# Patient Record
Sex: MALE | Race: WHITE | NOT HISPANIC OR LATINO | Employment: FULL TIME | URBAN - METROPOLITAN AREA
[De-identification: names, ages, dates, MRNs, and addresses within clinical notes are randomized per-mention and may not be internally consistent; named-entity substitution may affect disease eponyms.]

---

## 2017-03-13 ENCOUNTER — HOSPITAL ENCOUNTER (EMERGENCY)
Facility: MEDICAL CENTER | Age: 22
End: 2017-03-13
Attending: EMERGENCY MEDICINE
Payer: COMMERCIAL

## 2017-03-13 ENCOUNTER — APPOINTMENT (OUTPATIENT)
Dept: RADIOLOGY | Facility: MEDICAL CENTER | Age: 22
End: 2017-03-13
Attending: EMERGENCY MEDICINE
Payer: COMMERCIAL

## 2017-03-13 VITALS
BODY MASS INDEX: 19.41 KG/M2 | WEIGHT: 128.09 LBS | HEART RATE: 52 BPM | DIASTOLIC BLOOD PRESSURE: 54 MMHG | HEIGHT: 68 IN | OXYGEN SATURATION: 97 % | RESPIRATION RATE: 16 BRPM | SYSTOLIC BLOOD PRESSURE: 88 MMHG | TEMPERATURE: 97.3 F

## 2017-03-13 DIAGNOSIS — R07.9 ACUTE CHEST PAIN: ICD-10-CM

## 2017-03-13 DIAGNOSIS — Z87.74 HISTORY OF CONGENITAL HEART DISEASE: ICD-10-CM

## 2017-03-13 DIAGNOSIS — T50.905A MEDICATION SIDE EFFECT, INITIAL ENCOUNTER: ICD-10-CM

## 2017-03-13 DIAGNOSIS — I95.9 HYPOTENSION, UNSPECIFIED HYPOTENSION TYPE: ICD-10-CM

## 2017-03-13 LAB
ALBUMIN SERPL BCP-MCNC: 4.7 G/DL (ref 3.2–4.9)
ALBUMIN/GLOB SERPL: 1.8 G/DL
ALP SERPL-CCNC: 80 U/L (ref 30–99)
ALT SERPL-CCNC: 18 U/L (ref 2–50)
ANION GAP SERPL CALC-SCNC: 9 MMOL/L (ref 0–11.9)
APTT PPP: 29.3 SEC (ref 24.7–36)
AST SERPL-CCNC: 26 U/L (ref 12–45)
BASOPHILS # BLD AUTO: 0.4 % (ref 0–1.8)
BASOPHILS # BLD: 0.03 K/UL (ref 0–0.12)
BILIRUB SERPL-MCNC: 2.5 MG/DL (ref 0.1–1.5)
BNP SERPL-MCNC: 17 PG/ML (ref 0–100)
BUN SERPL-MCNC: 15 MG/DL (ref 8–22)
CALCIUM SERPL-MCNC: 9.5 MG/DL (ref 8.5–10.5)
CHLORIDE SERPL-SCNC: 105 MMOL/L (ref 96–112)
CO2 SERPL-SCNC: 25 MMOL/L (ref 20–33)
CREAT SERPL-MCNC: 0.77 MG/DL (ref 0.5–1.4)
EKG IMPRESSION: NORMAL
EOSINOPHIL # BLD AUTO: 0.04 K/UL (ref 0–0.51)
EOSINOPHIL NFR BLD: 0.5 % (ref 0–6.9)
ERYTHROCYTE [DISTWIDTH] IN BLOOD BY AUTOMATED COUNT: 39.4 FL (ref 35.9–50)
GFR SERPL CREATININE-BSD FRML MDRD: >60 ML/MIN/1.73 M 2
GLOBULIN SER CALC-MCNC: 2.6 G/DL (ref 1.9–3.5)
GLUCOSE SERPL-MCNC: 94 MG/DL (ref 65–99)
HCT VFR BLD AUTO: 45.1 % (ref 42–52)
HGB BLD-MCNC: 14.2 G/DL (ref 14–18)
IMM GRANULOCYTES # BLD AUTO: 0.03 K/UL (ref 0–0.11)
IMM GRANULOCYTES NFR BLD AUTO: 0.4 % (ref 0–0.9)
INR PPP: 0.97 (ref 0.87–1.13)
LIPASE SERPL-CCNC: 13 U/L (ref 11–82)
LYMPHOCYTES # BLD AUTO: 1.34 K/UL (ref 1–4.8)
LYMPHOCYTES NFR BLD: 16.3 % (ref 22–41)
MCH RBC QN AUTO: 26 PG (ref 27–33)
MCHC RBC AUTO-ENTMCNC: 31.5 G/DL (ref 33.7–35.3)
MCV RBC AUTO: 82.6 FL (ref 81.4–97.8)
MONOCYTES # BLD AUTO: 0.45 K/UL (ref 0–0.85)
MONOCYTES NFR BLD AUTO: 5.5 % (ref 0–13.4)
NEUTROPHILS # BLD AUTO: 6.34 K/UL (ref 1.82–7.42)
NEUTROPHILS NFR BLD: 76.9 % (ref 44–72)
NRBC # BLD AUTO: 0 K/UL
NRBC BLD AUTO-RTO: 0 /100 WBC
PLATELET # BLD AUTO: 228 K/UL (ref 164–446)
PMV BLD AUTO: 10.4 FL (ref 9–12.9)
POTASSIUM SERPL-SCNC: 4.5 MMOL/L (ref 3.6–5.5)
PROT SERPL-MCNC: 7.3 G/DL (ref 6–8.2)
PROTHROMBIN TIME: 13.2 SEC (ref 12–14.6)
RBC # BLD AUTO: 5.46 M/UL (ref 4.7–6.1)
SODIUM SERPL-SCNC: 139 MMOL/L (ref 135–145)
TROPONIN I SERPL-MCNC: <0.01 NG/ML (ref 0–0.04)
WBC # BLD AUTO: 8.2 K/UL (ref 4.8–10.8)

## 2017-03-13 PROCEDURE — 700102 HCHG RX REV CODE 250 W/ 637 OVERRIDE(OP): Performed by: EMERGENCY MEDICINE

## 2017-03-13 PROCEDURE — 99284 EMERGENCY DEPT VISIT MOD MDM: CPT

## 2017-03-13 PROCEDURE — 85610 PROTHROMBIN TIME: CPT

## 2017-03-13 PROCEDURE — 83690 ASSAY OF LIPASE: CPT

## 2017-03-13 PROCEDURE — 93005 ELECTROCARDIOGRAM TRACING: CPT

## 2017-03-13 PROCEDURE — 84484 ASSAY OF TROPONIN QUANT: CPT

## 2017-03-13 PROCEDURE — 83880 ASSAY OF NATRIURETIC PEPTIDE: CPT

## 2017-03-13 PROCEDURE — 80053 COMPREHEN METABOLIC PANEL: CPT

## 2017-03-13 PROCEDURE — 85025 COMPLETE CBC W/AUTO DIFF WBC: CPT

## 2017-03-13 PROCEDURE — 71010 DX-CHEST-LIMITED (1 VIEW): CPT

## 2017-03-13 PROCEDURE — A9270 NON-COVERED ITEM OR SERVICE: HCPCS | Performed by: EMERGENCY MEDICINE

## 2017-03-13 PROCEDURE — 85730 THROMBOPLASTIN TIME PARTIAL: CPT

## 2017-03-13 RX ADMIN — LIDOCAINE HYDROCHLORIDE 30 ML: 20 SOLUTION OROPHARYNGEAL at 07:01

## 2017-03-13 ASSESSMENT — LIFESTYLE VARIABLES: DO YOU DRINK ALCOHOL: NO

## 2017-03-13 ASSESSMENT — PAIN SCALES - GENERAL: PAINLEVEL_OUTOF10: 0

## 2017-03-13 NOTE — ED PROVIDER NOTES
ED Provider Note    Scribed for Cody Peterson M.D. by Bebeto Madrigal. 3/13/2017  5:22 AM    Primary care provider: Sariah Brand M.D.  Means of arrival: walk in  History obtained from: patient  History limited by: none    CHIEF COMPLAINT  Chief Complaint   Patient presents with   • Chest Pain       HPI  Jamal Childress is a 21 y.o. male who presents to the Emergency Department with central, deep chest pain starting 5 hours ago. He describes his chest pain as crushing that is 10/10 severity. He denies that the pain radiates. Patient has associated nausea. Patient has a history of chest pain starting 2 years ago secondary to arterial enlargement, but states that this pain is significantly worse today. Patient is followed by Dr. Armando cardiologist for enlarging artery. He had an echocardiogram 2 months ago that he repots was improved from previous. Father states that the patient was placed on Losartan with improvement to his undelying chronic condition. He denies vomiting, shortness of breath.    REVIEW OF SYSTEMS  Pertinent positives include chest pain, nausea.   Pertinent negatives include no vomiting, shortness of breath.    All other systems reviewed and negative.    C.    PAST MEDICAL HISTORY   has a past medical history of Transposition of great vessels.    SURGICAL HISTORY   has past surgical history that includes other cardiac surgery.    SOCIAL HISTORY  Social History   Substance Use Topics   • Smoking status: Never Smoker    • Smokeless tobacco: None noted   • Alcohol Use: Yes      Comment: occ      History   Drug Use No       FAMILY HISTORY  None noted for this encounter.    CURRENT MEDICATIONS  Home Medications     Reviewed by Enedina Olmstead R.N. (Registered Nurse) on 03/13/17 at 0232  Med List Status: Complete    Medication Last Dose Status    losartan (COZAAR) 50 MG TABS 3/13/2017 Active                ALLERGIES  No Known Allergies    PHYSICAL EXAM  VITAL SIGNS: BP 88/54 mmHg  Pulse 56   "Temp(Src) 36.3 °C (97.3 °F)  Resp 17  Ht 1.727 m (5' 8\")  Wt 58.1 kg (128 lb 1.4 oz)  BMI 19.48 kg/m2  SpO2 99%    Nursing note and vitals reviewed.  Constitutional: Well-developed and well-nourished. No distress.   HENT: Head is normocephalic and atraumatic. Oropharynx is clear and moist without exudate or erythema.   Eyes: Pupils are equal, round, and reactive to light. Conjunctiva are normal.   Cardiovascular: Bradycardic. Normal radial pulses. Systolic ejection murmur best heard at the left upper sternal border.  Pulmonary/Chest: Breath sounds normal. No wheezes or rales. Well healed sternotomy scar.   Abdominal: Soft and non-tender. No distention    Musculoskeletal: Extremities exhibit normal range of motion without edema or tenderness.   Neurological: Awake, alert and oriented to person, place, and time. No focal deficits noted.  Skin: Skin is warm and dry. No rash.   Psychiatric: Normal mood and affect. Appropriate for clinical situation    DIAGNOSTIC STUDIES / PROCEDURES    EKG Interpretation  See Labs below.     LABS  Results for orders placed or performed during the hospital encounter of 03/13/17   TROPONIN   Result Value Ref Range    Troponin I <0.01 0.00 - 0.04 ng/mL   BTYPE NATRIURETIC PEPTIDE   Result Value Ref Range    B Natriuretic Peptide 17 0 - 100 pg/mL   CBC WITH DIFFERENTIAL   Result Value Ref Range    WBC 8.2 4.8 - 10.8 K/uL    RBC 5.46 4.70 - 6.10 M/uL    Hemoglobin 14.2 14.0 - 18.0 g/dL    Hematocrit 45.1 42.0 - 52.0 %    MCV 82.6 81.4 - 97.8 fL    MCH 26.0 (L) 27.0 - 33.0 pg    MCHC 31.5 (L) 33.7 - 35.3 g/dL    RDW 39.4 35.9 - 50.0 fL    Platelet Count 228 164 - 446 K/uL    MPV 10.4 9.0 - 12.9 fL    Neutrophils-Polys 76.90 (H) 44.00 - 72.00 %    Lymphocytes 16.30 (L) 22.00 - 41.00 %    Monocytes 5.50 0.00 - 13.40 %    Eosinophils 0.50 0.00 - 6.90 %    Basophils 0.40 0.00 - 1.80 %    Immature Granulocytes 0.40 0.00 - 0.90 %    Nucleated RBC 0.00 /100 WBC    Neutrophils (Absolute) 6.34 " 1.82 - 7.42 K/uL    Lymphs (Absolute) 1.34 1.00 - 4.80 K/uL    Monos (Absolute) 0.45 0.00 - 0.85 K/uL    Eos (Absolute) 0.04 0.00 - 0.51 K/uL    Baso (Absolute) 0.03 0.00 - 0.12 K/uL    Immature Granulocytes (abs) 0.03 0.00 - 0.11 K/uL    NRBC (Absolute) 0.00 K/uL   COMP METABOLIC PANEL   Result Value Ref Range    Sodium 139 135 - 145 mmol/L    Potassium 4.5 3.6 - 5.5 mmol/L    Chloride 105 96 - 112 mmol/L    Co2 25 20 - 33 mmol/L    Anion Gap 9.0 0.0 - 11.9    Glucose 94 65 - 99 mg/dL    Bun 15 8 - 22 mg/dL    Creatinine 0.77 0.50 - 1.40 mg/dL    Calcium 9.5 8.5 - 10.5 mg/dL    AST(SGOT) 26 12 - 45 U/L    ALT(SGPT) 18 2 - 50 U/L    Alkaline Phosphatase 80 30 - 99 U/L    Total Bilirubin 2.5 (H) 0.1 - 1.5 mg/dL    Albumin 4.7 3.2 - 4.9 g/dL    Total Protein 7.3 6.0 - 8.2 g/dL    Globulin 2.6 1.9 - 3.5 g/dL    A-G Ratio 1.8 g/dL   PROTHROMBIN TIME   Result Value Ref Range    PT 13.2 12.0 - 14.6 sec    INR 0.97 0.87 - 1.13   APTT   Result Value Ref Range    APTT 29.3 24.7 - 36.0 sec   LIPASE   Result Value Ref Range    Lipase 13 11 - 82 U/L   ESTIMATED GFR   Result Value Ref Range    GFR If African American >60 >60 mL/min/1.73 m 2    GFR If Non African American >60 >60 mL/min/1.73 m 2   EKG (ER)   Result Value Ref Range    Report       [facility] Emergency Dept.    Test Date:  2017  Pt Name:    GARETH MCCARTHY                 Department: ER  MRN:        [id number]                      Room:  Gender:     SADIA                            Technician: [id number]  :        1995                   Requested By:ER TRIAGE PROTOCOL  Order #:    [id number]                    Reading MD:    Measurements  Intervals                                Axis  Rate:       67                           P:          23  VT:         140                          QRS:        78  QRSD:       106                          T:          74  QT:         432  QTc:        456    Interpretive Statements  SINUS RHYTHM  PROBABLE LEFT  VENTRICULAR HYPERTROPHY  LATERAL Q WAVES, PROBABLY NORMAL VARIATION  No previous ECG available for comparison     All labs reviewed by me.    RADIOLOGY  DX-CHEST-LIMITED (1 VIEW)   Final Result         1. Cardiomegaly. No pulmonary infiltrates or consolidations are noted.      The radiologist's interpretation of all radiological studies have been reviewed by me.    COURSE & MEDICAL DECISION MAKING  Nursing notes, VS, PMSFHx reviewed in chart.     Review of past medical records shows the patient is being followed by Dr. Armando.      5:22 AM - Patient seen and examined at bedside. Ordered DX chest, Estimated GFR, Troponin, BNP, CBC with differential, CMP, Prothrombin time, APTT, Lipase to evaluate his symptoms. The differential diagnoses include but are not limited to: chest wall pain, arrythmia, pneumonia, GERD, pneumothorax.    6:51 AM - I discussed the patient's case and the above findings with Dr. Armando (cardiologist) who recommends reducing his losartan to 1/2 tablet each night. She does not think that CT scan or echocardiogram is indicated at this time. I agree and will inform the patient.     6:56 AM Recheck: Patient re-evaluated at beside. Discussed patient's condition and treatment plan. Patient's lab and radiology results discussed. Patient will be treated with GI cocktail 30mg PO. He will be discharged and given discharge instructions. Advised to follow up with Dr. Armando. Instructed to return to Emergency Department immediately if any worsening symptoms. Patient verbalizes understanding and agreement to this plan of care.     The patient will return for new or worsening symptoms and is stable at the time of discharge.    DISPOSITION:  Patient will be discharged home in stable condition.    FOLLOW UP:  [facility], Emergency Dept  [address] [address] [phone]    If symptoms worsen    Charbel Armando M.D.  [address]  [address]  [facility] [id number] [phone]    Call  today        OUTPATIENT MEDICATIONS:  New Prescriptions    No medications on file           FINAL IMPRESSION  1. Acute chest pain    2. History of congenital heart disease    3. Medication side effect, initial encounter    4. Hypotension, unspecified hypotension type          I, Bebeto Madrigal (Scribe), am scribing for, and in the presence of, Cody Peterson M.D..    Electronically signed by: Bebeto Madrigal (Scribe), 3/13/2017    ICody M.D. personally performed the services described in this documentation, as scribed by Bebeto Madrigal in my presence, and it is both accurate and complete.    The note accurately reflects work and decisions made by me.  Cody Peterson  3/13/2017  11:20 AM

## 2017-03-13 NOTE — DISCHARGE INSTRUCTIONS
Chest Pain, Nonspecific  It is often hard to give a specific diagnosis for the cause of chest pain. There is always a chance that your pain could be related to something serious, like a heart attack or a blood clot in the lungs. You need to follow up with your caregiver for further evaluation. More lab tests or other studies such as X-rays, electrocardiography, stress testing, or cardiac imaging may be needed to find the cause of your pain.  Most of the time, nonspecific chest pain improves within 2 to 3 days with rest and mild pain medicine. For the next few days, avoid physical exertion or activities that bring on pain. Do not smoke. Avoid drinking alcohol. Call your caregiver for routine follow-up as advised.   SEEK IMMEDIATE MEDICAL CARE IF:  · You develop increased chest pain or pain that radiates to the arm, neck, jaw, back, or abdomen.   · You develop shortness of breath, increased coughing, or you start coughing up blood.   · You have severe back or abdominal pain, nausea, or vomiting.   · You develop severe weakness, fainting, fever, or chills.   Document Released: 12/18/2006 Document Revised: 03/11/2013 Document Reviewed: 06/06/2008  [email or web address]® Patient Information ©2013 [employer].

## 2017-03-13 NOTE — ED AVS SNAPSHOT
Home Care Instructions                                                                                                                Jamal Childress   MRN: [id number]    Department:  [facility], Emergency Dept   Date of Visit:  3/13/2017            [facility], Emergency Dept    [address] [address]    Phone:  [phone]      You were seen by     Cody Peterson M.D.      Your Diagnosis Was     Acute chest pain     R07.9       These are the medications you received during your hospitalization from 03/13/2017 0211 to 03/13/2017 0704     Date/Time Order Dose Route Action    03/13/2017 0701 hyoscyamine-maalox plus-lidocaine viscous (GI COCKTAIL) oral susp 30 mL 30 mL Oral Given      Follow-up Information     1. Follow up with [facility], Emergency Dept.    Specialty:  Emergency Medicine    Why:  If symptoms worsen    Contact information    [address] [place] [address] [phone]        2. Follow up with Charbel Armando M.D.. Call today.    Specialty:  Pediatric Cardiology    Contact information    [address]  [address]  [facility] [address] [phone]        Medication Information     Review all of your home medications and newly ordered medications with your primary doctor and/or pharmacist as soon as possible. Follow medication instructions as directed by your doctor and/or pharmacist.     Please keep your complete medication list with you and share with your physician. Update the information when medications are discontinued, doses are changed, or new medications (including over-the-counter products) are added; and carry medication information at all times in the event of emergency situations.               Medication List      ASK your doctor about these medications        Instructions    Morning Afternoon Evening Bedtime    losartan 50 MG Tabs   Commonly known as:  COZAAR        Take 50 mg by mouth every day.   Dose:   50 mg                                Procedures and tests performed during your visit     APTT    BTYPE NATRIURETIC PEPTIDE    CARDIAC MONITORING    CBC WITH DIFFERENTIAL    COMP METABOLIC PANEL    DX-CHEST-LIMITED (1 VIEW)    EKG (ER)    ESTIMATED GFR    LIPASE    O2 Protocol    PROTHROMBIN TIME    TROPONIN        Discharge Instructions       Chest Pain, Nonspecific  It is often hard to give a specific diagnosis for the cause of chest pain. There is always a chance that your pain could be related to something serious, like a heart attack or a blood clot in the lungs. You need to follow up with your caregiver for further evaluation. More lab tests or other studies such as X-rays, electrocardiography, stress testing, or cardiac imaging may be needed to find the cause of your pain.  Most of the time, nonspecific chest pain improves within 2 to 3 days with rest and mild pain medicine. For the next few days, avoid physical exertion or activities that bring on pain. Do not smoke. Avoid drinking alcohol. Call your caregiver for routine follow-up as advised.   SEEK IMMEDIATE MEDICAL CARE IF:  · You develop increased chest pain or pain that radiates to the arm, neck, jaw, back, or abdomen.   · You develop shortness of breath, increased coughing, or you start coughing up blood.   · You have severe back or abdominal pain, nausea, or vomiting.   · You develop severe weakness, fainting, fever, or chills.   Document Released: 12/18/2006 Document Revised: 03/11/2013 Document Reviewed: 06/06/2008  [employer]® Patient Information ©2013 [employer].          Patient Information     Patient Information    Following emergency treatment: all patient requiring follow-up care must return either to a private physician or a clinic if your condition worsens before you are able to obtain further medical attention, please return to the emergency room.     Billing Information    At [facility], we work to make the billing process streamlined  for our patients.  Our Representatives are here to answer any questions you may have regarding your hospital bill.  If you have insurance coverage and have supplied your insurance information to us, we will submit a claim to your insurer on your behalf.  Should you have any questions regarding your bill, we can be reached online or by phone as follows:  Online: You are able pay your bills online or live chat with our representatives about any billing questions you may have. We are here to help Monday - Friday from 8:00am to 7:30pm and 9:00am - 12:00pm on Saturdays.  Please visit [email or web address]  for more information.   Phone:  [phone] or [phone]    Please note that your emergency physician, surgeon, pathologist, radiologist, anesthesiologist, and other specialists are not employed by [facility] and will therefore bill separately for their services.  Please contact them directly for any questions concerning their bills at the numbers below:     Emergency Physician Services:  [phone]  [facility]:  [phone]  [facility]:  [phone]  [facility]:  [phone]    1. Your final bill may vary from the amount quoted upon discharge if all procedures are not complete at that time, or if your doctor has additional procedures of which we are not aware. You will receive an additional bill if you return to the Emergency Department at [facility] for suture removal regardless of the facility of which the sutures were placed.     2. Please arrange for settlement of this account at the emergency registration.    3. All self-pay accounts are due in full at the time of treatment.  If you are unable to meet this obligation then payment is expected within 4-5 days.     4. If you have had radiology studies (CT, X-ray, Ultrasound, MRI), you have received a preliminary result during your emergency department visit. Please contact  the radiology department [phone] to receive a copy of your final result. Please discuss the Final result with your primary physician or with the follow up physician provided.     Crisis Hotline:  [place] Crisis Hotline:  7-978-TVKYUEO or 1-855.620.3994  [place] Crisis Hotline:    1-575.571.2200 or 679-217-9491         ED Discharge Follow Up Questions    1. In order to provide you with very good care, we would like to follow up with a phone call in the next few days.  May we have your permission to contact you?     YES /  NO    2. What is the best phone number to call you? (       )_____-__________    3. What is the best time to call you?      Morning  /  Afternoon  /  Evening                   Patient Signature:  ____________________________________________________________    Date:  ____________________________________________________________

## 2017-03-13 NOTE — ED NOTES
"Patient experienced a syncopal or near syncopal event after blood draw. He was walking to bathroom and \"felt everything go black\". It was witnessed that he lowered himself to ground. He is pale appearing in triage. BP 88/54, HR 54.   "

## 2017-03-13 NOTE — ED AVS SNAPSHOT
[employer] Access Code: [email or web address]  Expires: 4/12/2017  7:04 AM    [employer]  A secure, online tool to manage your health information     [employer]’s [employer]® is a secure, online tool that connects you to your personalized health information from the privacy of your home -- day or night - making it very easy for you to manage your healthcare. Once the activation process is completed, you can even access your medical information using the [employer] matthew, which is available for free in the Apple Matthew store or Google Play store.     [employer] provides the following levels of access (as shown below):   My Chart Features   [facility] Primary Care Doctor [facility]  Specialists [facility]  Urgent  Care Non-[facility]  Primary Care  Doctor   Email your healthcare team securely and privately 24/7 X X X X   Manage appointments: schedule your next appointment; view details of past/upcoming appointments X      Request prescription refills. X      View recent personal medical records, including lab and immunizations X X X X   View health record, including health history, allergies, medications X X X X   Read reports about your outpatient visits, procedures, consult and ER notes X X X X   See your discharge summary, which is a recap of your hospital and/or ER visit that includes your diagnosis, lab results, and care plan. X X       How to register for [employer]:  1. Go to  [email or web address].  2. Click on the Sign Up Now box, which takes you to the New Member Sign Up page. You will need to provide the following information:  a. Enter your [employer] Access Code exactly as it appears at the top of this page. (You will not need to use this code after you’ve completed the sign-up process. If you do not sign up before the expiration date, you must request a new code.)   b. Enter your date of birth.   c. Enter your home email address.   d. Click Submit, and follow the next screen’s instructions.  3. Create a [employer] ID. This will be your [employer]  login ID and cannot be changed, so think of one that is secure and easy to remember.  4. Create a [employer] password. You can change your password at any time.  5. Enter your Password Reset Question and Answer. This can be used at a later time if you forget your password.   6. Enter your e-mail address. This allows you to receive e-mail notifications when new information is available in [employer].  7. Click Sign Up. You can now view your health information.    For assistance activating your [employer] account, call [phone]

## 2017-03-13 NOTE — ED NOTES
Patient to ED triage with complaints of crushing chest pain. Had to events tonight. Recently at 0100. He was in shoulder, did not radiate, nothing made it better or worse, was 10/10. Hx of great vessel transposition. He not experienced this chest pain before. CP is currently resolved. Had EKG in triage.   Pt educated on ED process and asked to wait in lobby until appropriate bed assignment can be made. Patient educated on importance of alerting staff to new or worsening symptoms or concerns.

## 2017-03-13 NOTE — ED AVS SNAPSHOT
3/13/2017          Jamal Childress  [address] [address]    Dear Jamal:    [facility] wants to ensure your discharge home is safe and you or your loved ones have had all your questions answered regarding your care after you leave the hospital.    You may receive a telephone call within two days of your discharge.  This call is to make certain you understand your discharge instructions as well as ensure we provided you with the best care possible during your stay with us.     The call will only last approximately 3-5 minutes and will be done by a nurse.    Once again, we want to ensure your discharge home is safe and that you have a clear understanding of any next steps in your care.  If you have any questions or concerns, please do not hesitate to contact us, we are here for you.  Thank you for choosing [facility] for your healthcare needs.    Sincerely,    Filemon Campbell    [facility]

## 2017-04-12 ENCOUNTER — HOSPITAL ENCOUNTER (OUTPATIENT)
Dept: RADIOLOGY | Facility: MEDICAL CENTER | Age: 22
End: 2017-04-12
Attending: EMERGENCY MEDICINE
Payer: COMMERCIAL

## 2017-04-12 ENCOUNTER — HOSPITAL ENCOUNTER (OUTPATIENT)
Dept: LAB | Facility: MEDICAL CENTER | Age: 22
End: 2017-04-12
Attending: EMERGENCY MEDICINE
Payer: COMMERCIAL

## 2017-04-12 ENCOUNTER — OFFICE VISIT (OUTPATIENT)
Dept: URGENT CARE | Facility: PHYSICIAN GROUP | Age: 22
End: 2017-04-12
Payer: COMMERCIAL

## 2017-04-12 VITALS
TEMPERATURE: 98.3 F | SYSTOLIC BLOOD PRESSURE: 122 MMHG | BODY MASS INDEX: 20.08 KG/M2 | HEART RATE: 64 BPM | RESPIRATION RATE: 14 BRPM | WEIGHT: 132 LBS | DIASTOLIC BLOOD PRESSURE: 78 MMHG | OXYGEN SATURATION: 97 %

## 2017-04-12 DIAGNOSIS — R10.10 PAIN OF UPPER ABDOMEN: ICD-10-CM

## 2017-04-12 LAB
ALBUMIN SERPL BCP-MCNC: 4.6 G/DL (ref 3.2–4.9)
ALBUMIN/GLOB SERPL: 1.6 G/DL
ALP SERPL-CCNC: 82 U/L (ref 30–99)
ALT SERPL-CCNC: 15 U/L (ref 2–50)
ANION GAP SERPL CALC-SCNC: 7 MMOL/L (ref 0–11.9)
AST SERPL-CCNC: 21 U/L (ref 12–45)
BASOPHILS # BLD AUTO: 0.4 % (ref 0–1.8)
BASOPHILS # BLD: 0.03 K/UL (ref 0–0.12)
BILIRUB SERPL-MCNC: 1 MG/DL (ref 0.1–1.5)
BUN SERPL-MCNC: 22 MG/DL (ref 8–22)
CALCIUM SERPL-MCNC: 9.5 MG/DL (ref 8.5–10.5)
CHLORIDE SERPL-SCNC: 105 MMOL/L (ref 96–112)
CO2 SERPL-SCNC: 26 MMOL/L (ref 20–33)
CREAT SERPL-MCNC: 1.9 MG/DL (ref 0.5–1.4)
EOSINOPHIL # BLD AUTO: 0.07 K/UL (ref 0–0.51)
EOSINOPHIL NFR BLD: 0.9 % (ref 0–6.9)
ERYTHROCYTE [DISTWIDTH] IN BLOOD BY AUTOMATED COUNT: 38.6 FL (ref 35.9–50)
GFR SERPL CREATININE-BSD FRML MDRD: 44 ML/MIN/1.73 M 2
GLOBULIN SER CALC-MCNC: 2.9 G/DL (ref 1.9–3.5)
GLUCOSE SERPL-MCNC: 96 MG/DL (ref 65–99)
HCT VFR BLD AUTO: 45 % (ref 42–52)
HGB BLD-MCNC: 14.4 G/DL (ref 14–18)
IMM GRANULOCYTES # BLD AUTO: 0.03 K/UL (ref 0–0.11)
IMM GRANULOCYTES NFR BLD AUTO: 0.4 % (ref 0–0.9)
LIPASE SERPL-CCNC: 9 U/L (ref 11–82)
LYMPHOCYTES # BLD AUTO: 1.14 K/UL (ref 1–4.8)
LYMPHOCYTES NFR BLD: 14 % (ref 22–41)
MCH RBC QN AUTO: 26.4 PG (ref 27–33)
MCHC RBC AUTO-ENTMCNC: 32 G/DL (ref 33.7–35.3)
MCV RBC AUTO: 82.4 FL (ref 81.4–97.8)
MONOCYTES # BLD AUTO: 0.67 K/UL (ref 0–0.85)
MONOCYTES NFR BLD AUTO: 8.2 % (ref 0–13.4)
NEUTROPHILS # BLD AUTO: 6.21 K/UL (ref 1.82–7.42)
NEUTROPHILS NFR BLD: 76.1 % (ref 44–72)
NRBC # BLD AUTO: 0 K/UL
NRBC BLD AUTO-RTO: 0 /100 WBC
PLATELET # BLD AUTO: 231 K/UL (ref 164–446)
PMV BLD AUTO: 10 FL (ref 9–12.9)
POTASSIUM SERPL-SCNC: 4.3 MMOL/L (ref 3.6–5.5)
PROT SERPL-MCNC: 7.5 G/DL (ref 6–8.2)
RBC # BLD AUTO: 5.46 M/UL (ref 4.7–6.1)
SODIUM SERPL-SCNC: 138 MMOL/L (ref 135–145)
WBC # BLD AUTO: 8.2 K/UL (ref 4.8–10.8)

## 2017-04-12 PROCEDURE — 36415 COLL VENOUS BLD VENIPUNCTURE: CPT

## 2017-04-12 PROCEDURE — 76705 ECHO EXAM OF ABDOMEN: CPT

## 2017-04-12 PROCEDURE — 83013 H PYLORI (C-13) BREATH: CPT

## 2017-04-12 PROCEDURE — 99214 OFFICE O/P EST MOD 30 MIN: CPT | Performed by: EMERGENCY MEDICINE

## 2017-04-12 PROCEDURE — 80053 COMPREHEN METABOLIC PANEL: CPT

## 2017-04-12 PROCEDURE — 85025 COMPLETE CBC W/AUTO DIFF WBC: CPT

## 2017-04-12 PROCEDURE — 83690 ASSAY OF LIPASE: CPT

## 2017-04-12 RX ORDER — ONDANSETRON 4 MG/1
4 TABLET, ORALLY DISINTEGRATING ORAL EVERY 8 HOURS PRN
Qty: 10 TAB | Refills: 1 | Status: SHIPPED | OUTPATIENT
Start: 2017-04-12 | End: 2017-05-09

## 2017-04-12 RX ORDER — OMEPRAZOLE 20 MG/1
20 CAPSULE, DELAYED RELEASE ORAL DAILY
Qty: 30 CAP | Refills: 3 | Status: SHIPPED | OUTPATIENT
Start: 2017-04-12 | End: 2017-05-09

## 2017-04-12 ASSESSMENT — ENCOUNTER SYMPTOMS
EYE REDNESS: 0
EYE DISCHARGE: 0
SPEECH CHANGE: 0
ABDOMINAL PAIN: 1
NAUSEA: 1
HEMOPTYSIS: 0
ANOREXIA: 1
COUGH: 0
HEADACHES: 0
FEVER: 0
VOMITING: 1
BACK PAIN: 0
NECK PAIN: 0
CHILLS: 0
DIARRHEA: 0
SENSORY CHANGE: 0
STRIDOR: 0
HEMATOCHEZIA: 0
NERVOUS/ANXIOUS: 0

## 2017-04-12 NOTE — PROGRESS NOTES
Subjective:      Jamal Childress is a 21 y.o. male who presents with Abdominal Pain            Abdominal Pain  This is a new problem. The current episode started 1 to 4 weeks ago (patient is felt for the past 2-3 weeks bilateral upper abdominal pain. Started vomiting a couple days ago. Denies vomiting any blood or diarrhea. He has not been constipated.). The onset quality is gradual. The problem occurs intermittently. The problem has been waxing and waning. The pain is located in the LUQ and RUQ. The pain is moderate. Associated symptoms include anorexia, nausea and vomiting. Pertinent negatives include no diarrhea, fever, headaches, hematochezia or melena. Associated symptoms comments: Patient states that he's afraid to eat. Denies any abuse of alcohol or tobacco NSAIDs or caffeine. Does drink sodas maximum 3 a day.    Patient wakes up approximately 5 AM with pain times past 3 days.. The pain is aggravated by eating. The pain is relieved by nothing. He has tried nothing for the symptoms.   No Known Allergies.  Social History     Social History   • Marital Status: Single     Spouse Name: N/A   • Number of Children: N/A   • Years of Education: N/A     Occupational History   • Not on file.     Social History Main Topics   • Smoking status: Never Smoker    • Smokeless tobacco: Not on file   • Alcohol Use: Yes      Comment: occ   • Drug Use: No   • Sexual Activity: Not on file     Other Topics Concern   • Not on file     Social History Narrative     Past Medical History   Diagnosis Date   • Transposition of great vessels      Current Outpatient Prescriptions on File Prior to Visit   Medication Sig Dispense Refill   • losartan (COZAAR) 50 MG TABS Take 50 mg by mouth every day.       No current facility-administered medications on file prior to visit.   History reviewed. No pertinent family history.  Review of Systems   Constitutional: Negative for fever and chills.   HENT: Negative for congestion.    Eyes: Negative  for discharge and redness.   Respiratory: Negative for cough, hemoptysis and stridor.    Cardiovascular: Negative for leg swelling.   Gastrointestinal: Positive for nausea, vomiting, abdominal pain and anorexia. Negative for diarrhea, melena and hematochezia.   Musculoskeletal: Negative for back pain and neck pain.   Skin: Negative for rash.   Neurological: Negative for sensory change, speech change and headaches.   Psychiatric/Behavioral: The patient is not nervous/anxious.           Objective:     /78 mmHg  Pulse 64  Temp(Src) 36.8 °C (98.3 °F)  Resp 14  Wt 59.875 kg (132 lb)  SpO2 97%     Physical Exam   Constitutional: He appears well-nourished. No distress.       HENT:   Head: Normocephalic and atraumatic.   Right Ear: External ear normal.   Mouth/Throat: No oropharyngeal exudate.   Eyes: Conjunctivae are normal. No scleral icterus.   Neck: Normal range of motion.   Cardiovascular: Normal rate and regular rhythm.    Transposition of the great vessels with a systolic murmur present. Recently saw cardiologist one week ago with no complications.   Pulmonary/Chest: No stridor.   Abdominal: He exhibits no distension and no mass. There is no tenderness. There is no rebound and no guarding. No hernia.   Patient currently without any discomfort but states the pain is present intermittently throughout the day.   Genitourinary: Prostate normal. Guaiac negative stool.   Musculoskeletal: Normal range of motion.   Neurological: He is alert.   Skin: Skin is warm and dry. He is not diaphoretic.   Psychiatric: He has a normal mood and affect. His behavior is normal.   Vitals reviewed.              Assessment/Plan:     DX Abdominal pain         S/P repair of transposition of great vessels    Point of care urine Urine trace heme    CPC    Metabolic panel    Lipase    Gallbladder ultrasound.    Patient will stay on clear liquids she will could overlap and have his blood drawn and subsequently get a ultrasound at 4 PM I  will talk to him after his lab test before he leaves.

## 2017-04-12 NOTE — MR AVS SNAPSHOT
"        Jamal Childress   2017 12:30 PM   Office Visit   MRN: [id number]    Department:  [facility]   Dept Phone:  [phone]    Description:  Male : 1995   Provider:  MINGO Sotelo M.D.           Reason for Visit     Abdominal Pain sharp pains in sides of abd, nausea, vomiting x 3 days      Allergies as of 2017     No Known Allergies      You were diagnosed with     Pain of upper abdomen   [[id number]]         Vital Signs     Blood Pressure Pulse Temperature Respirations Weight Oxygen Saturation    122/78 mmHg 64 36.8 °C (98.3 °F) 14 59.875 kg (132 lb) 97%    Smoking Status                   Never Smoker            Basic Information     Date Of Birth Sex Race Ethnicity Preferred Language    1995 Male White Non- English      Your appointments     2017  4:00 PM   US ABDOMEN FASTING (30 MINUTES) with [facility] US [facility]   [facility]    [address]  [facility] [id number] [phone]           NPO 8 hours.  For  Abdomen, NPO 2 hours, schedule Abdomen Complete and include \" Abdomen\" in Appt Notes.              Health Maintenance        Date Due Completion Dates    IMM HEP B VACCINE (1 of 3 - Primary Series) 1993 ---    IMM HEP A VACCINE (1 of 2 - Standard Series) 1994 ---    IMM HPV VACCINE (1 of 3 - Male 3 Dose Series) 2004 ---    IMM VARICELLA (CHICKENPOX) VACCINE (1 of 2 - 2 Dose Adolescent Series) 2006 ---    IMM DTaP/Tdap/Td Vaccine (2 - Td) 2024            Current Immunizations     Tdap Vaccine 2014      Below and/or attached are the medications your provider expects you to take. Review all of your home medications and newly ordered medications with your provider and/or pharmacist. Follow medication instructions as directed by your provider and/or pharmacist. Please keep your medication list with you and share with your provider. Update the information when medications are discontinued, " doses are changed, or new medications (including over-the-counter products) are added; and carry medication information at all times in the event of emergency situations     Allergies:  No Known Allergies          Medications  Valid as of: April 12, 2017 -  2:11 PM    Generic Name Brand Name Tablet Size Instructions for use    Losartan Potassium (Tab) COZAAR 50 MG Take 50 mg by mouth every day.        .                 Medicines prescribed today were sent to:     [facility] [address] [place] [address] - [address] [id number] [facility] [address]    Phone: [phone] Fax: [phone]    Open 24 Hours?: No      Medication refill instructions:       If your prescription bottle indicates you have medication refills left, it is not necessary to call your provider’s office. Please contact your pharmacy and they will refill your medication.    If your prescription bottle indicates you do not have any refills left, you may request refills at any time through one of the following ways: The online [employer] system (except Urgent Care), by calling your provider’s office, or by asking your pharmacy to contact your provider’s office with a refill request. Medication refills are processed only during regular business hours and may not be available until the next business day. Your provider may request additional information or to have a follow-up visit with you prior to refilling your medication.   *Please Note: Medication refills are assigned a new Rx number when refilled electronically. Your pharmacy may indicate that no refills were authorized even though a new prescription for the same medication is available at the pharmacy. Please request the medicine by name with the pharmacy before contacting your provider for a refill.        Your To Do List     Future Labs/Procedures Complete By Expires    CBC WITH DIFFERENTIAL  As directed 4/12/2018    COMP METABOLIC PANEL  As directed 4/13/2018    LIPASE  As  directed 4/12/2018    US-GALLBLADDER  As directed 4/12/2018         Leadspace Access Code: [email or web address]  Expires: 5/12/2017  2:11 PM    Leadspace  A secure, online tool to manage your health information     [employer]’s Leadspace® is a secure, online tool that connects you to your personalized health information from the privacy of your home -- day or night - making it very easy for you to manage your healthcare. Once the activation process is completed, you can even access your medical information using the Leadspace matthew, which is available for free in the Apple Matthew store or Google Play store.     Leadspace provides the following levels of access (as shown below):   My Chart Features   [facility] Doctor [facility]  Specialists [facility]  Urgent  Care Non-[facility]  Primary Care  Doctor   Email your healthcare team securely and privately 24/7 X X X    Manage appointments: schedule your next appointment; view details of past/upcoming appointments X      Request prescription refills. X      View recent personal medical records, including lab and immunizations X X X X   View health record, including health history, allergies, medications X X X X   Read reports about your outpatient visits, procedures, consult and ER notes X X X X   See your discharge summary, which is a recap of your hospital and/or ER visit that includes your diagnosis, lab results, and care plan. X X       How to register for Leadspace:  1. Go to  [email or web address].  2. Click on the Sign Up Now box, which takes you to the New Member Sign Up page. You will need to provide the following information:  a. Enter your Leadspace Access Code exactly as it appears at the top of this page. (You will not need to use this code after you’ve completed the sign-up process. If you do not sign up before the expiration date, you must request a new code.)   b. Enter your date of birth.   c. Enter your home email address.   d. Click Submit, and follow the next  screen’s instructions.  3. Create a microDimensionst ID. This will be your microDimensionst login ID and cannot be changed, so think of one that is secure and easy to remember.  4. Create a microDimensionst password. You can change your password at any time.  5. Enter your Password Reset Question and Answer. This can be used at a later time if you forget your password.   6. Enter your e-mail address. This allows you to receive e-mail notifications when new information is available in [employer].  7. Click Sign Up. You can now view your health information.    For assistance activating your [employer] account, call [phone]

## 2017-04-13 ENCOUNTER — TELEPHONE (OUTPATIENT)
Dept: URGENT CARE | Facility: CLINIC | Age: 22
End: 2017-04-13

## 2017-04-13 NOTE — TELEPHONE ENCOUNTER
She wants to see you next week thank you I called and spoke to Dr. Márquez regarding the patient's GFR dropping from 60-44 as well as BUNs and creatinines elevating with a creatinine of 1.9. She is going to see the patient next week repeat his labs and refer him as appropriate.

## 2017-05-09 ENCOUNTER — HOSPITAL ENCOUNTER (OUTPATIENT)
Dept: LAB | Facility: MEDICAL CENTER | Age: 22
End: 2017-05-09
Attending: NURSE PRACTITIONER
Payer: COMMERCIAL

## 2017-05-09 ENCOUNTER — OFFICE VISIT (OUTPATIENT)
Dept: MEDICAL GROUP | Facility: PHYSICIAN GROUP | Age: 22
End: 2017-05-09
Payer: COMMERCIAL

## 2017-05-09 VITALS
BODY MASS INDEX: 19.7 KG/M2 | DIASTOLIC BLOOD PRESSURE: 76 MMHG | HEART RATE: 67 BPM | TEMPERATURE: 99.1 F | HEIGHT: 68 IN | WEIGHT: 130 LBS | SYSTOLIC BLOOD PRESSURE: 112 MMHG | OXYGEN SATURATION: 98 % | RESPIRATION RATE: 14 BRPM

## 2017-05-09 DIAGNOSIS — N17.9 ACUTE NONTRAUMATIC KIDNEY INJURY (HCC): Primary | ICD-10-CM

## 2017-05-09 DIAGNOSIS — N17.9 ACUTE NONTRAUMATIC KIDNEY INJURY (HCC): ICD-10-CM

## 2017-05-09 PROBLEM — Q20.3 TRANSPOSITION OF GREAT VESSELS: Status: ACTIVE | Noted: 2017-05-09

## 2017-05-09 LAB
ALBUMIN SERPL BCP-MCNC: 5 G/DL (ref 3.2–4.9)
ALBUMIN/GLOB SERPL: 1.9 G/DL
ALP SERPL-CCNC: 88 U/L (ref 30–99)
ALT SERPL-CCNC: 18 U/L (ref 2–50)
ANION GAP SERPL CALC-SCNC: 8 MMOL/L (ref 0–11.9)
APPEARANCE UR: CLEAR
AST SERPL-CCNC: 25 U/L (ref 12–45)
BILIRUB SERPL-MCNC: 2.2 MG/DL (ref 0.1–1.5)
BILIRUB UR QL STRIP.AUTO: NEGATIVE
BUN SERPL-MCNC: 16 MG/DL (ref 8–22)
CALCIUM SERPL-MCNC: 9.7 MG/DL (ref 8.5–10.5)
CHLORIDE SERPL-SCNC: 103 MMOL/L (ref 96–112)
CO2 SERPL-SCNC: 28 MMOL/L (ref 20–33)
COLOR UR: COLORLESS
CREAT SERPL-MCNC: 0.78 MG/DL (ref 0.5–1.4)
CULTURE IF INDICATED INDCX: NO UA CULTURE
GFR SERPL CREATININE-BSD FRML MDRD: >60 ML/MIN/1.73 M 2
GLOBULIN SER CALC-MCNC: 2.6 G/DL (ref 1.9–3.5)
GLUCOSE SERPL-MCNC: 90 MG/DL (ref 65–99)
GLUCOSE UR STRIP.AUTO-MCNC: NEGATIVE MG/DL
KETONES UR STRIP.AUTO-MCNC: NEGATIVE MG/DL
LEUKOCYTE ESTERASE UR QL STRIP.AUTO: NEGATIVE
MICRO URNS: NORMAL
NITRITE UR QL STRIP.AUTO: NEGATIVE
PH UR STRIP.AUTO: 6.5 [PH]
POTASSIUM SERPL-SCNC: 4.6 MMOL/L (ref 3.6–5.5)
PROT SERPL-MCNC: 7.6 G/DL (ref 6–8.2)
PROT UR QL STRIP: NEGATIVE MG/DL
RBC UR QL AUTO: NEGATIVE
SODIUM SERPL-SCNC: 139 MMOL/L (ref 135–145)
SP GR UR STRIP.AUTO: 1.01

## 2017-05-09 PROCEDURE — 99213 OFFICE O/P EST LOW 20 MIN: CPT | Performed by: NURSE PRACTITIONER

## 2017-05-09 PROCEDURE — 80053 COMPREHEN METABOLIC PANEL: CPT

## 2017-05-09 PROCEDURE — 36415 COLL VENOUS BLD VENIPUNCTURE: CPT

## 2017-05-09 PROCEDURE — 81003 URINALYSIS AUTO W/O SCOPE: CPT

## 2017-05-09 ASSESSMENT — PATIENT HEALTH QUESTIONNAIRE - PHQ9: CLINICAL INTERPRETATION OF PHQ2 SCORE: 0

## 2017-05-09 NOTE — MR AVS SNAPSHOT
"        Jamal Childress   2017 9:00 AM   Office Visit   MRN: [id number]    Department:  [facility]   Dept Phone:  [phone]    Description:  Male : 1995   Provider:  MICHELE Hogue           Reason for Visit     Orders Needed Renal function labs       Allergies as of 2017     No Known Allergies      You were diagnosed with     Acute nontraumatic kidney injury (CMS-HCC)   [017981]  -  Primary       Vital Signs     Blood Pressure Pulse Temperature Respirations Height Weight    112/76 mmHg 67 37.3 °C (99.1 °F) 14 1.727 m (5' 8\") 58.968 kg (130 lb)    Body Mass Index Oxygen Saturation Smoking Status             19.77 kg/m2 98% Never Smoker          Basic Information     Date Of Birth Sex Race Ethnicity Preferred Language    1995 Male White Non- English      Problem List              ICD-10-CM Priority Class Noted - Resolved    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      Health Maintenance        Date Due Completion Dates    IMM HEP B VACCINE (1 of 3 - Primary Series) 1993 ---    IMM HEP A VACCINE (1 of 2 - Standard Series) 1994 ---    IMM HPV VACCINE (1 of 3 - Male 3 Dose Series) 2004 ---    IMM VARICELLA (CHICKENPOX) VACCINE (1 of 2 - 2 Dose Adolescent Series) 2006 ---    IMM DTaP/Tdap/Td Vaccine (2 - Td) 2024            Current Immunizations     Tdap Vaccine 2014      Below and/or attached are the medications your provider expects you to take. Review all of your home medications and newly ordered medications with your provider and/or pharmacist. Follow medication instructions as directed by your provider and/or pharmacist. Please keep your medication list with you and share with your provider. Update the information when medications are discontinued, doses are changed, or new medications (including over-the-counter products) are added; and carry medication information at all times in the event of emergency " situations     Allergies:  No Known Allergies          Medications  Valid as of: May 09, 2017 -  9:37 AM    Generic Name Brand Name Tablet Size Instructions for use    Losartan Potassium (Tab) COZAAR 50 MG Take 50 mg by mouth every day.        .                 Medicines prescribed today were sent to:     [facility] [address] [place] [address], [place] - [address] [id number] [facility] [place] [address]    Phone: [phone] Fax: [phone]    Open 24 Hours?: No      Medication refill instructions:       If your prescription bottle indicates you have medication refills left, it is not necessary to call your provider’s office. Please contact your pharmacy and they will refill your medication.    If your prescription bottle indicates you do not have any refills left, you may request refills at any time through one of the following ways: The online RightAnswers system (except Urgent Care), by calling your provider’s office, or by asking your pharmacy to contact your provider’s office with a refill request. Medication refills are processed only during regular business hours and may not be available until the next business day. Your provider may request additional information or to have a follow-up visit with you prior to refilling your medication.   *Please Note: Medication refills are assigned a new Rx number when refilled electronically. Your pharmacy may indicate that no refills were authorized even though a new prescription for the same medication is available at the pharmacy. Please request the medicine by name with the pharmacy before contacting your provider for a refill.        Your To Do List     Future Labs/Procedures Complete By Expires    COMP METABOLIC PANEL  As directed 5/9/2018    URINALYSIS,CULTURE IF INDICATED  As directed 5/9/2018      Referral     A referral request has been sent to our patient care coordination department. Please allow 3-5 business days for us to process this request and contact you  either by phone or mail. If you do not hear from us by the 5th business day, please call us at [phone].           FashFolio Access Code: [email or web address]  Expires: 6/3/2017  3:50 PM    FashFolio  A secure, online tool to manage your health information     [employer]’s FashFolio® is a secure, online tool that connects you to your personalized health information from the privacy of your home -- day or night - making it very easy for you to manage your healthcare. Once the activation process is completed, you can even access your medical information using the FashFolio matthew, which is available for free in the Apple Matthew store or Google Play store.     FashFolio provides the following levels of access (as shown below):   My Chart Features   [facility] Primary [facility] Doctor [facility]  Specialists [facility]  Urgent  Care [facility]  Primary Care  Doctor   Email your healthcare team securely and privately 24/7 X X X    Manage appointments: schedule your next appointment; view details of past/upcoming appointments X      Request prescription refills. X      View recent personal medical records, including lab and immunizations X X X X   View health record, including health history, allergies, medications X X X X   Read reports about your outpatient visits, procedures, consult and ER notes X X X X   See your discharge summary, which is a recap of your hospital and/or ER visit that includes your diagnosis, lab results, and care plan. X X       How to register for FashFolio:  1. Go to  [email or web address].  2. Click on the Sign Up Now box, which takes you to the New Member Sign Up page. You will need to provide the following information:  a. Enter your FashFolio Access Code exactly as it appears at the top of this page. (You will not need to use this code after you’ve completed the sign-up process. If you do not sign up before the expiration date, you must request a new code.)   b. Enter your date of birth.   c. Enter your home email  address.   d. Click Submit, and follow the next screen’s instructions.  3. Create a [employer] ID. This will be your [employer] login ID and cannot be changed, so think of one that is secure and easy to remember.  4. Create a [employer] password. You can change your password at any time.  5. Enter your Password Reset Question and Answer. This can be used at a later time if you forget your password.   6. Enter your e-mail address. This allows you to receive e-mail notifications when new information is available in [employer].  7. Click Sign Up. You can now view your health information.    For assistance activating your [employer] account, call [phone]        Quit Tobacco Information     Do you want to quit using tobacco?    Quitting tobacco decreases risks of cancer, heart and lung disease, increases life expectancy, improves sense of taste and smell, and increases spending money, among other benefits.    If you are thinking about quitting, we can help.  • [facility] Quit Tobacco Program: 729.601.8616  o Program occurs weekly for four weeks and includes pharmacist consultation on products to support quitting smoking or chewing tobacco. A provider referral is needed for pharmacist consultation.  • Tobacco Users Help Hotline: 4-968-QUIT-NOW (683-0772) or https://[place].quitlogix.org/  o Free, confidential telephone and online coaching for [place] residents. Sessions are designed on a schedule that is convenient for you. Eligible clients receive free nicotine replacement therapy.  • Nationally: www.smokefree.gov  o Information and professional assistance to support both immediate and long-term needs as you become, and remain, a non-smoker. Smokefree.gov allows you to choose the help that best fits your needs.

## 2017-05-09 NOTE — PROGRESS NOTES
Chief Complaint   Patient presents with   • Orders Needed     Renal function labs        HISTORY OF PRESENT ILLNESS: Patient is a 21 y.o. male established patient who presents today to follow-up on recent urgent care visit.  Patient states that he has been assigned to Dr. Bonds by his insurance but has never seen a primary care provider in [facility] urgent care.  He is under the care of Dr. Charbel Armando since the age of 4 for transposition of great vessels.    1. Acute nontraumatic kidney injury (CMS-HCC)  Patient was seen in the urgent care last week with complaints of abdominal pain.  He states that urine sample was performed as well as blood work.  There is significant concern about acute kidney failure.  He also mentions that he was told that there was blood in the urine however there are no urine results available for my review.  Patient mentions that he was being accused of being anorexic IV drug user at the time of the appointment.  Patient denies any drug use and states that he has a normal appetite.  Patient reports taking valsartan daily, denies any over-the-counter anti-inflammatories.  He believes that he is well hydrated and denies any recent change in his urinary habits in terms of frequency and appearance of the urine.  He denies any kalyan hematuria.  He states that the abdominal pain has improved.  He denies any back pain.    Allergies:Review of patient's allergies indicates no known allergies.    Current Outpatient Prescriptions Ordered in [facility]   Medication Sig Dispense Refill   • losartan (COZAAR) 50 MG TABS Take 50 mg by mouth every day.       No current [facility]-ordered facility-administered medications on file.       Past Medical History   Diagnosis Date   • Transposition of great vessels        Social History   Substance Use Topics   • Smoking status: Never Smoker    • Smokeless tobacco: Current User   • Alcohol Use: Yes      Comment: occ       No family status information on file.   No family  "history on file.    ROS: see above    Review of Systems   Constitutional: Negative for fever, chills, weight loss and malaise/fatigue.   HENT: Negative for ear pain, nosebleeds, congestion, sore throat and neck pain.    Eyes: Negative for blurred vision.   Respiratory: Negative for cough, sputum production, shortness of breath and wheezing.    Cardiovascular: Negative for chest pain, palpitations, orthopnea and leg swelling.   Gastrointestinal: Negative for heartburn, nausea, vomiting and abdominal pain.   Genitourinary: Negative for dysuria, urgency and frequency.   Musculoskeletal: Negative for myalgias, back pain and joint pain.   Skin: Negative for rash and itching.   Neurological: Negative for dizziness, tingling, tremors, sensory change, focal weakness and headaches.   Endo/Heme/Allergies: Does not bruise/bleed easily.   Psychiatric/Behavioral: Negative for depression, suicidal ideas and memory loss.  The patient is not nervous/anxious and does not have insomnia.        Exam:  Blood pressure 112/76, pulse 67, temperature 37.3 °C (99.1 °F), resp. rate 14, height 1.727 m (5' 8\"), weight 58.968 kg (130 lb), SpO2 98 %.  General:  Well nourished, well developed male in NAD  Head is grossly normal.  Neck: Thyroid is not enlarged.  Pulmonary: Normal effort.   Cardiovascular: Regular rate.   Abdomen:  BS+ all quadrants. No distention, tenderness or CVAT.      Extremities: no clubbing, cyanosis, or edema.  Psych:  Mood and affect are normal.  Answering questions appropriately with good eye contact.      Please note that this dictation was created using voice recognition software. I have made every reasonable attempt to correct obvious errors, but I expect that there are errors of grammar and possibly content that I did not discover before finalizing the note.    Assessment/Plan:    1. Acute nontraumatic kidney injury (CMS-HCC)  REFERRAL TO NEPHROLOGY    COMP METABOLIC PANEL    URINALYSIS,CULTURE IF INDICATED        1.  " Repeat urine and CMP today.  2.  Referral to nephrology place, pending lab results.  3.  Advised patient to avoid any over-the-counter anti-inflammatories until labs are reviewed.  4.  Follow-up with Dr. Armando, encourage patient to establish with a PCP.

## 2017-05-10 ENCOUNTER — TELEPHONE (OUTPATIENT)
Dept: MEDICAL GROUP | Facility: PHYSICIAN GROUP | Age: 22
End: 2017-05-10

## 2017-05-10 NOTE — TELEPHONE ENCOUNTER
----- Message from MICHELE Hogue sent at 5/10/2017  7:03 AM PDT -----  Please let Jamal know that his labs have returned to normal.  No need for further evaluation or consults at this time.  Please remind him to remain hydrated and avoid excessive use of NSAIDS.    Thank you

## 2017-08-14 ENCOUNTER — OFFICE VISIT (OUTPATIENT)
Dept: URGENT CARE | Facility: PHYSICIAN GROUP | Age: 22
End: 2017-08-14
Payer: COMMERCIAL

## 2017-08-14 VITALS
TEMPERATURE: 99.3 F | WEIGHT: 131 LBS | DIASTOLIC BLOOD PRESSURE: 76 MMHG | OXYGEN SATURATION: 97 % | RESPIRATION RATE: 14 BRPM | BODY MASS INDEX: 19.85 KG/M2 | HEIGHT: 68 IN | HEART RATE: 67 BPM | SYSTOLIC BLOOD PRESSURE: 132 MMHG

## 2017-08-14 DIAGNOSIS — B27.90 MONONUCLEOSIS: ICD-10-CM

## 2017-08-14 LAB
HETEROPH AB SER QL LA: NORMAL
INT CON NEG: NEGATIVE
INT CON NEG: NEGATIVE
INT CON POS: POSITIVE
INT CON POS: POSITIVE
S PYO AG THROAT QL: NORMAL

## 2017-08-14 PROCEDURE — 86308 HETEROPHILE ANTIBODY SCREEN: CPT | Performed by: PHYSICIAN ASSISTANT

## 2017-08-14 PROCEDURE — 99214 OFFICE O/P EST MOD 30 MIN: CPT | Performed by: PHYSICIAN ASSISTANT

## 2017-08-14 PROCEDURE — 87880 STREP A ASSAY W/OPTIC: CPT | Performed by: PHYSICIAN ASSISTANT

## 2017-08-14 RX ORDER — DIPHENHYDRAMINE HYDROCHLORIDE AND LIDOCAINE HYDROCHLORIDE AND ALUMINUM HYDROXIDE AND MAGNESIUM HYDRO
5 KIT EVERY 6 HOURS PRN
Qty: 100 ML | Refills: 0 | Status: SHIPPED | OUTPATIENT
Start: 2017-08-14 | End: 2021-08-12

## 2017-08-14 ASSESSMENT — ENCOUNTER SYMPTOMS
SORE THROAT: 1
DIARRHEA: 0
ABDOMINAL PAIN: 0
NAUSEA: 0
SHORTNESS OF BREATH: 0
MUSCULOSKELETAL NEGATIVE: 1
SWOLLEN GLANDS: 0
CHILLS: 0
DIZZINESS: 0
HEADACHES: 0
TROUBLE SWALLOWING: 1
VOMITING: 0
SPUTUM PRODUCTION: 0
COUGH: 1
FEVER: 0

## 2017-08-14 NOTE — Clinical Note
August 14, 2017         Patient: Jamal Childress   YOB: 1995   Date of Visit: 8/14/2017           To Whom it May Concern:    Jamal Childress was seen in my clinic on 8/14/2017. Please excuse his absence from 8/16/17-8/17/17.    If you have any questions or concerns, please don't hesitate to call.        Sincerely,           Julia Reaves PA-C  Electronically Signed

## 2017-08-14 NOTE — PATIENT INSTRUCTIONS
"Infectious Mononucleosis  Infectious mononucleosis is an infection caused by a virus. This illness is often called \"mono.\" It causes symptoms that affect various areas of the body, including the throat, upper air passages, and lymph glands. The liver or spleen may also be affected.  The virus spreads from person to person through close contact. The illness is usually not serious and often goes away in 2-4 weeks without treatment. In rare cases, symptoms can be more severe and last longer, sometimes up to several months. Because the illness can sometimes cause the liver or spleen to become enlarged, you should not participate in contact sports or strenuous exercise until your health care provider approves.  CAUSES   Infectious mononucleosis is caused by the Andra-Barr virus. This virus spreads through contact with an infected person's saliva or other bodily fluids. It is often spread through kissing. It may also spread through coughing or sharing utensils or drinking glasses that were recently used by an infected person. An infected person will not always appear ill but can still spread the virus.  RISK FACTORS  This illness is most common in adolescents and young adults.  SIGNS AND SYMPTOMS   The most common symptoms of infectious mononucleosis are:  · Sore throat.    · Headache.    · Fatigue.    · Muscle aches.    · Swollen glands.    · Fever.    · Poor appetite.    · Enlarged liver or spleen.    Some less common symptoms that can also occur include:  · Rash. This is more common if you take antibiotic medicines.  · Feeling sick to your stomach (nauseous).    · Abdominal pain.    DIAGNOSIS   Your health care provider will take your medical history and do a physical exam. Blood tests can be done to confirm the diagnosis.   TREATMENT   Infectious mononucleosis usually goes away on its own with time. It cannot be cured with medicines, but medicines are sometimes used to relieve symptoms. Steroid medicine is sometimes " needed if the swelling in the throat causes breathing or swallowing problems. Treatment in a hospital is sometimes needed for severe cases.   HOME CARE INSTRUCTIONS   · Rest as needed.    · Do not participate in contact sports, strenuous exercise, or heavy lifting until your health care provider approves. The liver and spleen could be seriously injured if they are enlarged from the illness. You may need to wait a couple months before participating in sports.    · Drink enough fluid to keep your urine clear or pale yellow.    · Do not drink alcohol.  · Take medicines only as directed by your health care provider. Children under 18 years of age should not take aspirin because of the association with Reye syndrome.    · Eat soft foods. Cold foods such as ice cream or frozen ice pops can soothe a sore throat.  · If you have a sore throat, gargle with a mixture of salt and water. This may help relieve your discomfort. Mix 1 tsp of salt in 1 cup of warm water. Sucking on hard candy may also help.    · Start regular activities gradually after the fever is gone. Be sure to rest when tired.    · Avoid kissing or sharing utensils or drinking glasses until your health care provider tells you that you are no longer contagious.    PREVENTION   To avoid spreading the virus, do not kiss anyone or share utensils, drinking glasses, or food until your health care provider tells you that you are no longer contagious.  SEEK MEDICAL CARE IF:   · Your fever is not gone after 10 days.  · You have swollen lymph nodes that are not back to normal after 4 weeks.  · Your activity level is not back to normal after 2 months.    · You have yellow coloring to your eyes and skin (jaundice).  · You have constipation.    SEEK IMMEDIATE MEDICAL CARE IF:   · You have severe pain in the abdomen or shoulder.  · You are drooling.  · You have trouble swallowing.  · You have trouble breathing.  · You develop a stiff neck.  · You develop a severe  headache.  · You cannot stop throwing up (vomiting).  · You have convulsions.  · You are confused.  · You have trouble with balance.  · You have signs of dehydration. These may include:  ¨ Weakness.  ¨ Sunken eyes.  ¨ Pale skin.  ¨ Dry mouth.  ¨ Rapid breathing or pulse.     This information is not intended to replace advice given to you by your health care provider. Make sure you discuss any questions you have with your health care provider.     Document Released: 12/15/2001 Document Revised: 01/08/2016 Document Reviewed: 08/25/2015  [employer] Interactive Patient Education ©2016 [employer]

## 2017-08-14 NOTE — MR AVS SNAPSHOT
"        Jamal Childress   2017 12:00 PM   Office Visit   MRN: [id number]    Department:  [facility]   Dept Phone:  [phone]    Description:  Male : 1995   Provider:  Julia Reaves PA-C           Reason for Visit     Sore Throat sore throat, cough x5 days      Allergies as of 2017     No Known Allergies      You were diagnosed with     Sore throat   [[id number]]         Vital Signs     Blood Pressure Pulse Temperature Respirations Height Weight    132/76 mmHg 67 37.4 °C (99.3 °F) 14 1.727 m (5' 7.99\") 59.421 kg (131 lb)    Body Mass Index Oxygen Saturation Smoking Status             19.92 kg/m2 97% Never Smoker          Basic Information     Date Of Birth Sex Race Ethnicity Preferred Language    1995 Male White Non- English      Problem List              ICD-10-CM Priority Class Noted - Resolved    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      Health Maintenance        Date Due Completion Dates    IMM HEP B VACCINE (1 of 3 - Primary Series) 1993 ---    IMM HEP A VACCINE (1 of 2 - Standard Series) 1994 ---    IMM HPV VACCINE (1 of 3 - Male 3 Dose Series) 2004 ---    IMM VARICELLA (CHICKENPOX) VACCINE (1 of 2 - 2 Dose Adolescent Series) 2006 ---    IMM INFLUENZA (1) 2017 ---    IMM DTaP/Tdap/Td Vaccine (2 - Td) 2024            Results     POCT Rapid Strep A      Component    Rapid Strep Screen    neg    Internal Control Positive    Positive    Internal Control Negative    Negative                        Current Immunizations     Tdap Vaccine 2014      Below and/or attached are the medications your provider expects you to take. Review all of your home medications and newly ordered medications with your provider and/or pharmacist. Follow medication instructions as directed by your provider and/or pharmacist. Please keep your medication list with you and share with your provider. Update the information when " medications are discontinued, doses are changed, or new medications (including over-the-counter products) are added; and carry medication information at all times in the event of emergency situations     Allergies:  No Known Allergies          Medications  Valid as of: August 14, 2017 - 12:53 PM    Generic Name Brand Name Tablet Size Instructions for use    DPH-Lido-AlHydr-MgHydr-Simeth (Suspension) MAGIC MOUTHWASH BLM  Take 5 mL by mouth every 6 hours as needed.        Losartan Potassium (Tab) COZAAR 50 MG Take 50 mg by mouth every day.        .                 Medicines prescribed today were sent to:     [facility] [address] [place] [address] - [address] [id number] [facility] [address]    Phone: [phone] Fax: [phone]    Open 24 Hours?: No      Medication refill instructions:       If your prescription bottle indicates you have medication refills left, it is not necessary to call your provider’s office. Please contact your pharmacy and they will refill your medication.    If your prescription bottle indicates you do not have any refills left, you may request refills at any time through one of the following ways: The online [employer] system (except Urgent Care), by calling your provider’s office, or by asking your pharmacy to contact your provider’s office with a refill request. Medication refills are processed only during regular business hours and may not be available until the next business day. Your provider may request additional information or to have a follow-up visit with you prior to refilling your medication.   *Please Note: Medication refills are assigned a new Rx number when refilled electronically. Your pharmacy may indicate that no refills were authorized even though a new prescription for the same medication is available at the pharmacy. Please request the medicine by name with the pharmacy before contacting your provider for a refill.        Instructions    Infectious  "Mononucleosis  Infectious mononucleosis is an infection caused by a virus. This illness is often called \"mono.\" It causes symptoms that affect various areas of the body, including the throat, upper air passages, and lymph glands. The liver or spleen may also be affected.  The virus spreads from person to person through close contact. The illness is usually not serious and often goes away in 2-4 weeks without treatment. In rare cases, symptoms can be more severe and last longer, sometimes up to several months. Because the illness can sometimes cause the liver or spleen to become enlarged, you should not participate in contact sports or strenuous exercise until your health care provider approves.  CAUSES   Infectious mononucleosis is caused by the Andra-Barr virus. This virus spreads through contact with an infected person's saliva or other bodily fluids. It is often spread through kissing. It may also spread through coughing or sharing utensils or drinking glasses that were recently used by an infected person. An infected person will not always appear ill but can still spread the virus.  RISK FACTORS  This illness is most common in adolescents and young adults.  SIGNS AND SYMPTOMS   The most common symptoms of infectious mononucleosis are:  · Sore throat.    · Headache.    · Fatigue.    · Muscle aches.    · Swollen glands.    · Fever.    · Poor appetite.    · Enlarged liver or spleen.    Some less common symptoms that can also occur include:  · Rash. This is more common if you take antibiotic medicines.  · Feeling sick to your stomach (nauseous).    · Abdominal pain.    DIAGNOSIS   Your health care provider will take your medical history and do a physical exam. Blood tests can be done to confirm the diagnosis.   TREATMENT   Infectious mononucleosis usually goes away on its own with time. It cannot be cured with medicines, but medicines are sometimes used to relieve symptoms. Steroid medicine is sometimes needed if " the swelling in the throat causes breathing or swallowing problems. Treatment in a hospital is sometimes needed for severe cases.   HOME CARE INSTRUCTIONS   · Rest as needed.    · Do not participate in contact sports, strenuous exercise, or heavy lifting until your health care provider approves. The liver and spleen could be seriously injured if they are enlarged from the illness. You may need to wait a couple months before participating in sports.    · Drink enough fluid to keep your urine clear or pale yellow.    · Do not drink alcohol.  · Take medicines only as directed by your health care provider. Children under 18 years of age should not take aspirin because of the association with Reye syndrome.    · Eat soft foods. Cold foods such as ice cream or frozen ice pops can soothe a sore throat.  · If you have a sore throat, gargle with a mixture of salt and water. This may help relieve your discomfort. Mix 1 tsp of salt in 1 cup of warm water. Sucking on hard candy may also help.    · Start regular activities gradually after the fever is gone. Be sure to rest when tired.    · Avoid kissing or sharing utensils or drinking glasses until your health care provider tells you that you are no longer contagious.    PREVENTION   To avoid spreading the virus, do not kiss anyone or share utensils, drinking glasses, or food until your health care provider tells you that you are no longer contagious.  SEEK MEDICAL CARE IF:   · Your fever is not gone after 10 days.  · You have swollen lymph nodes that are not back to normal after 4 weeks.  · Your activity level is not back to normal after 2 months.    · You have yellow coloring to your eyes and skin (jaundice).  · You have constipation.    SEEK IMMEDIATE MEDICAL CARE IF:   · You have severe pain in the abdomen or shoulder.  · You are drooling.  · You have trouble swallowing.  · You have trouble breathing.  · You develop a stiff neck.  · You develop a severe headache.  · You  cannot stop throwing up (vomiting).  · You have convulsions.  · You are confused.  · You have trouble with balance.  · You have signs of dehydration. These may include:  ¨ Weakness.  ¨ Sunken eyes.  ¨ Pale skin.  ¨ Dry mouth.  ¨ Rapid breathing or pulse.     This information is not intended to replace advice given to you by your health care provider. Make sure you discuss any questions you have with your health care provider.     Document Released: 12/15/2001 Document Revised: 01/08/2016 Document Reviewed: 08/25/2015  [employer] Interactive Patient Education ©2016 [employer] Inc.            Advanced Oncotherapy Access Code: [email or web address]  Expires: 9/13/2017 12:00 PM    Advanced Oncotherapy  A secure, online tool to manage your health information     [employer]’s Advanced Oncotherapy® is a secure, online tool that connects you to your personalized health information from the privacy of your home -- day or night - making it very easy for you to manage your healthcare. Once the activation process is completed, you can even access your medical information using the Advanced Oncotherapy matthew, which is available for free in the Apple Matthew store or Google Play store.     Advanced Oncotherapy provides the following levels of access (as shown below):   My Chart Features   [facility] Doctor [facility]  Specialists [facility]  Urgent  Care Non-[facility]  Primary Care  Doctor   Email your healthcare team securely and privately 24/7 X X X    Manage appointments: schedule your next appointment; view details of past/upcoming appointments X      Request prescription refills. X      View recent personal medical records, including lab and immunizations X X X X   View health record, including health history, allergies, medications X X X X   Read reports about your outpatient visits, procedures, consult and ER notes X X X X   See your discharge summary, which is a recap of your hospital and/or ER visit that includes your diagnosis, lab results, and care plan. X X       How to register for  [employer]ass[employer]:  1. Go to  [email or web address].  2. Click on the Sign Up Now box, which takes you to the New Member Sign Up page. You will need to provide the following information:  a. Enter your ViewsIQ Access Code exactly as it appears at the top of this page. (You will not need to use this code after you’ve completed the sign-up process. If you do not sign up before the expiration date, you must request a new code.)   b. Enter your date of birth.   c. Enter your home email address.   d. Click Submit, and follow the next screen’s instructions.  3. Create a ViewsIQ ID. This will be your ViewsIQ login ID and cannot be changed, so think of one that is secure and easy to remember.  4. Create a CompassMedt password. You can change your password at any time.  5. Enter your Password Reset Question and Answer. This can be used at a later time if you forget your password.   6. Enter your e-mail address. This allows you to receive e-mail notifications when new information is available in ViewsIQ.  7. Click Sign Up. You can now view your health information.    For assistance activating your ViewsIQ account, call [phone]        Quit Tobacco Information     Do you want to quit using tobacco?    Quitting tobacco decreases risks of cancer, heart and lung disease, increases life expectancy, improves sense of taste and smell, and increases spending money, among other benefits.    If you are thinking about quitting, we can help.  • [facility] Quit Tobacco Program: 201.122.1695  o Program occurs weekly for four weeks and includes pharmacist consultation on products to support quitting smoking or chewing tobacco. A provider referral is needed for pharmacist consultation.  • Tobacco Users Help Hotline: 9-800-QUIT-NOW (713-6331) or https://[place].quitlogix.org/  o Free, confidential telephone and online coaching for [place] residents. Sessions are designed on a schedule that is convenient for you. Eligible clients receive free nicotine  replacement therapy.  • Nationally: www.smokefree.gov  o Information and professional assistance to support both immediate and long-term needs as you become, and remain, a non-smoker. Smokefree.gov allows you to choose the help that best fits your needs.

## 2017-08-14 NOTE — PROGRESS NOTES
"Subjective:      Jamal Childress is a 22 y.o. male who presents with Sore Throat            Pharyngitis   This is a new problem. The current episode started in the past 7 days (5 days). The problem has been unchanged. Neither side of throat is experiencing more pain than the other. There has been no fever. The pain is at a severity of 3/10. The pain is mild. Associated symptoms include coughing and trouble swallowing. Pertinent negatives include no abdominal pain, diarrhea, ear pain, headaches, shortness of breath, swollen glands or vomiting. He has had no exposure to strep or mono. Treatments tried: dayquil, nyquil. The treatment provided mild relief.   Patient denies history of frequent/recurrent strep infections or tonsillectomy. States he has a couple coworkers who currently have the similar symptoms. Denies any fevers, chills, nausea, vomiting, body aches, or abdominal pain.     Review of Systems   Constitutional: Negative for fever and chills.   HENT: Positive for sore throat and trouble swallowing. Negative for ear pain.    Respiratory: Positive for cough. Negative for sputum production and shortness of breath.    Cardiovascular: Negative for chest pain.   Gastrointestinal: Negative for nausea, vomiting, abdominal pain and diarrhea.   Genitourinary: Negative.    Musculoskeletal: Negative.    Neurological: Negative for dizziness and headaches.      Objective:     /76 mmHg  Pulse 67  Temp(Src) 37.4 °C (99.3 °F)  Resp 14  Ht 1.727 m (5' 7.99\")  Wt 59.421 kg (131 lb)  BMI 19.92 kg/m2  SpO2 97%     Physical Exam   Constitutional: He is oriented to person, place, and time. He appears well-developed and well-nourished. No distress.   HENT:   Head: Normocephalic and atraumatic.   Right Ear: Hearing, tympanic membrane, external ear and ear canal normal.   Left Ear: Hearing, tympanic membrane, external ear and ear canal normal.   Mouth/Throat: Uvula is midline. Posterior oropharyngeal erythema " present. No oropharyngeal exudate or posterior oropharyngeal edema.       Posterior oropharynx present without enlarged tonsils or exudates present.    Eyes: Conjunctivae are normal. Pupils are equal, round, and reactive to light. Right eye exhibits no discharge. Left eye exhibits no discharge.   Neck: Normal range of motion.   Cardiovascular: Normal rate, regular rhythm and normal heart sounds.    No murmur heard.  Pulmonary/Chest: Effort normal and breath sounds normal. No respiratory distress. He has no wheezes. He has no rales.   Abdominal: Soft. Bowel sounds are normal. He exhibits no distension. There is no tenderness. There is no rebound and no guarding.   No splenomegaly   Musculoskeletal: Normal range of motion.   Neurological: He is alert and oriented to person, place, and time.   Skin: Skin is warm and dry. He is not diaphoretic.   Psychiatric: He has a normal mood and affect. His behavior is normal.   Nursing note and vitals reviewed.         PMH:  has a past medical history of Transposition of great vessels. He also has no past medical history of Asthma, Depression, Hypertension, or Seizure (CMS-[facility]).  MEDS:   Current outpatient prescriptions:   •  DPH-Lido-AlHydr-MgHydr-Simeth (MAGIC MOUTHWASH BLM) Suspension, Take 5 mL by mouth every 6 hours as needed., Disp: 100 mL, Rfl: 0  •  losartan (COZAAR) 50 MG TABS, Take 50 mg by mouth every day., Disp: , Rfl:   ALLERGIES: No Known Allergies  SURGHX:   Past Surgical History   Procedure Laterality Date   • Other cardiac surgery       repair of great vessel transposition     SOCHX:  reports that he has never smoked. He uses smokeless tobacco. He reports that he drinks alcohol. He reports that he does not use illicit drugs.  FH: family history is not on file.       Assessment/Plan:     1. Mononucleosis  - POCT Rapid Strep A: NEGATIVE  - POCT Mononucleosis (mono): POSITIVE  - DPH-Lido-AlHydr-MgHydr-Simeth (MAGIC MOUTHWASH BLM) Suspension; Take 5 mL by mouth every  6 hours as needed.  Dispense: 100 mL; Refill: 0    Educated patient mono is viral in etiology, advised supportive care, increased fluids and rest. Patient advised to advise any contact sports or roughhousing for 3 weeks due to risk for splenic injury. Information handout given and all questions answered.  The patient demonstrated a good understanding and agreed with the treatment plan.

## 2017-10-19 ENCOUNTER — HOSPITAL ENCOUNTER (OUTPATIENT)
Dept: RADIOLOGY | Facility: MEDICAL CENTER | Age: 22
End: 2017-10-19
Attending: SURGERY
Payer: COMMERCIAL

## 2017-10-19 DIAGNOSIS — K80.11 CALCULUS OF GALLBLADDER WITH CHOLECYSTITIS WITH BILIARY OBSTRUCTION, UNSPECIFIED CHOLECYSTITIS ACUITY: ICD-10-CM

## 2017-10-19 PROCEDURE — 78226 HEPATOBILIARY SYSTEM IMAGING: CPT

## 2018-06-24 ENCOUNTER — HOSPITAL ENCOUNTER (OUTPATIENT)
Dept: RADIOLOGY | Facility: MEDICAL CENTER | Age: 23
End: 2018-06-24
Attending: NURSE PRACTITIONER
Payer: COMMERCIAL

## 2018-06-24 ENCOUNTER — OFFICE VISIT (OUTPATIENT)
Dept: URGENT CARE | Facility: PHYSICIAN GROUP | Age: 23
End: 2018-06-24
Payer: COMMERCIAL

## 2018-06-24 VITALS
DIASTOLIC BLOOD PRESSURE: 64 MMHG | SYSTOLIC BLOOD PRESSURE: 116 MMHG | HEART RATE: 79 BPM | BODY MASS INDEX: 19.7 KG/M2 | OXYGEN SATURATION: 98 % | WEIGHT: 130 LBS | TEMPERATURE: 98.9 F | HEIGHT: 68 IN

## 2018-06-24 DIAGNOSIS — S60.222A CONTUSION OF LEFT HAND, INITIAL ENCOUNTER: ICD-10-CM

## 2018-06-24 DIAGNOSIS — S69.92XA HAND INJURY, LEFT, INITIAL ENCOUNTER: ICD-10-CM

## 2018-06-24 PROCEDURE — 73130 X-RAY EXAM OF HAND: CPT | Mod: LT

## 2018-06-24 PROCEDURE — 99213 OFFICE O/P EST LOW 20 MIN: CPT | Performed by: NURSE PRACTITIONER

## 2018-06-24 ASSESSMENT — ENCOUNTER SYMPTOMS
TINGLING: 1
LOSS OF CONSCIOUSNESS: 0
SENSORY CHANGE: 1
WEAKNESS: 0

## 2018-06-24 NOTE — PROGRESS NOTES
"Subjective:      Jamal Childress is a 22 y.o. male who presents with Hand Injury (left hand injury  punched wall and car, x 1day)            HPI New problem. 22 year old male with left hand injury after punching a car and a wall early Saturday morning. States pain was bad yesterday, not so much today. Distal paresthesia only when in dependent position. Left hand dominant. Has iced and taken ibuprofen. No wrist pain or finger pain, most of pain in knuckles.  Patient has no known allergies.  Current Outpatient Prescriptions on File Prior to Visit   Medication Sig Dispense Refill   • DPH-Lido-AlHydr-MgHydr-Simeth (MAGIC MOUTHWASH BLM) Suspension Take 5 mL by mouth every 6 hours as needed. 100 mL 0   • losartan (COZAAR) 50 MG TABS Take 50 mg by mouth every day.       No current facility-administered medications on file prior to visit.      Social History     Social History   • Marital status: Single     Spouse name: N/A   • Number of children: N/A   • Years of education: N/A     Occupational History   • Not on file.     Social History Main Topics   • Smoking status: Never Smoker   • Smokeless tobacco: Current User   • Alcohol use 0.0 oz/week      Comment: occ   • Drug use: No   • Sexual activity: Not on file     Other Topics Concern   • Not on file     Social History Narrative   • No narrative on file     family history is not on file.      Review of Systems   Musculoskeletal: Positive for joint pain.   Neurological: Positive for tingling and sensory change. Negative for loss of consciousness and weakness.          Objective:     /64   Pulse 79   Temp 37.2 °C (98.9 °F)   Ht 1.727 m (5' 8\")   Wt 59 kg (130 lb)   SpO2 98%   BMI 19.77 kg/m²      Physical Exam   Constitutional: He is oriented to person, place, and time. He appears well-developed and well-nourished. No distress.   Musculoskeletal:        Left hand: He exhibits decreased range of motion, tenderness, bony tenderness, deformity and swelling.   "     Hands:  Neurological: He is alert and oriented to person, place, and time.   Skin: Skin is warm and dry. Bruising and ecchymosis noted.   Nursing note and vitals reviewed.              Assessment/Plan:     1. Contusion of left hand, initial encounter     2. Hand injury, left, initial encounter  DX-HAND 3+ LEFT     Negative x-ray.  nsaids and icing. Activity with this hand as tolerated.  Differential diagnosis, natural history, supportive care, and indications for immediate follow-up discussed at length.

## 2018-10-12 ENCOUNTER — OFFICE VISIT (OUTPATIENT)
Dept: URGENT CARE | Facility: PHYSICIAN GROUP | Age: 23
End: 2018-10-12
Payer: COMMERCIAL

## 2018-10-12 VITALS
TEMPERATURE: 97 F | OXYGEN SATURATION: 98 % | RESPIRATION RATE: 18 BRPM | SYSTOLIC BLOOD PRESSURE: 124 MMHG | DIASTOLIC BLOOD PRESSURE: 80 MMHG | HEART RATE: 63 BPM

## 2018-10-12 DIAGNOSIS — J06.9 VIRAL URI WITH COUGH: ICD-10-CM

## 2018-10-12 PROCEDURE — 99214 OFFICE O/P EST MOD 30 MIN: CPT | Performed by: PHYSICIAN ASSISTANT

## 2018-10-12 RX ORDER — FLUTICASONE PROPIONATE 50 MCG
1 SPRAY, SUSPENSION (ML) NASAL 2 TIMES DAILY
Qty: 16 G | Refills: 0 | Status: SHIPPED | OUTPATIENT
Start: 2018-10-12 | End: 2021-08-12

## 2018-10-12 RX ORDER — CODEINE PHOSPHATE AND GUAIFENESIN 10; 100 MG/5ML; MG/5ML
SOLUTION ORAL
Qty: 70 ML | Refills: 0 | Status: SHIPPED | OUTPATIENT
Start: 2018-10-12 | End: 2018-10-19

## 2018-10-12 NOTE — PROGRESS NOTES
Chief Complaint   Patient presents with   • Pharyngitis     x6 days ago, coughing, congestion        HISTORY OF PRESENT ILLNESS: Patient is a 23 y.o. male who presents today for 6 days of worsening cough/congestion.   He states he is coughing up mucus and at times is green.  He continues to have bilateral nasal congestion.    He notes he is having chest soreness when he coughs however denies any pleuritic chest pain, SOB.   No fevers he is aware of. He has not been taking any OTC really.  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    Current Outpatient Prescriptions Ordered in [facility]   Medication Sig Dispense Refill   • losartan (COZAAR) 50 MG TABS Take 50 mg by mouth every day.     • DPH-Lido-AlHydr-MgHydr-Simeth (MAGIC MOUTHWASH BLM) Suspension Take 5 mL by mouth every 6 hours as needed. 100 mL 0     No current Epic-ordered facility-administered medications on file.        Past Medical History:   Diagnosis Date   • Transposition of great vessels        Social History   Substance Use Topics   • Smoking status: Never Smoker   • Smokeless tobacco: Current User   • Alcohol use 0.0 oz/week      Comment: occ       No family status information on file.   No family history on file. No pertinent FH.     ROS:  Review of Systems   Constitutional: SEE HPI  HENT: SEE HPI  Eyes: Negative for blurred vision.   Respiratory: SEE HPI  Cardiovascular: Negative for chest pain, palpitations, orthopnea and leg swelling.   Gastrointestinal: Negative for heartburn, nausea, vomiting and abdominal pain.   Genitourinary: Negative for dysuria, urgency and frequency.     Exam:  Blood pressure 124/80, pulse 63, temperature 36.1 °C (97 °F), temperature source Temporal, resp. rate 18, SpO2 98 %.  General:  Well nourished, well  developed male in NAD  Eyes: PERRLA, EOM within normal limits, no conjunctival injection, no scleral icterus, visual fields and acuity grossly intact.  Ears: Normal shape and symmetry, no tenderness, no discharge. External canals are without any significant edema or erythema. Tympanic membranes are without any inflammation, no effusion. Gross auditory acuity is intact  Nose: Symmetrical, sinuses without tenderness, clear rhinorrhea.   Mouth: reasonable hygiene, mild posterior erythema exudates or tonsillar enlargement.  Neck: no masses, range of motion within normal limits, no tracheal deviation. No lymphadenopathy  Pulmonary: Normal respiratory effort, no wheezes, crackles, or rhonchi.  Cardiovascular: regular rate and rhythm without murmurs, rubs, or gallops.  Skin: No visible rashes or lesion. Warm, pink, dry.   Extremities: no clubbing, cyanosis, or edema.  Neuro: A&O x 3. Speech normal/clear.  Normal gait.         Assessment/Plan:  1. Viral URI with cough  fluticasone (FLONASE ALLERGY RELIEF) 50 MCG/ACT nasal spray    guaifenesin-codeine (CHERATUSSIN AC) Solution oral solution         -benign exam, vitals/O2 sats, lungs CTA  -discussed that I felt this was viral in nature. Did not see any evidence of a bacterial process. Discussed natural progression and sx care.  -saline/Flonase for sinus care.  Patient will continue to avoid decongestants.   -codeine cough syrup as above.  Emphasized drowsy and no driving on this medicine.  Patient expressed understanding of this.   -work note x tomorrow provided    Supportive care, differential diagnoses, and indications for immediate follow-up discussed with patient.   Pathogenesis of diagnosis discussed including typical length and natural progression.   Instructed to return to clinic or nearest emergency department for any change in condition, further concerns, or worsening of symptoms.  Patient states understanding of the plan of care and discharge  instructions.      Vanna Centeno P.A.-C.

## 2018-10-12 NOTE — LETTER
October 12, 2018         Patient: Jamal Childress   YOB: 1995   Date of Visit: 10/12/2018           To Whom it May Concern:    Jamal Childress was seen in my clinic on 10/12/2018.   I advise he be off work tomorrow to recover.     If you have any questions or concerns, please don't hesitate to call.        Sincerely,           Vanna Centeno P.A.-C.  Electronically Signed

## 2020-01-10 ENCOUNTER — OFFICE VISIT (OUTPATIENT)
Dept: URGENT CARE | Facility: PHYSICIAN GROUP | Age: 25
End: 2020-01-10
Payer: COMMERCIAL

## 2020-01-10 VITALS
TEMPERATURE: 98.1 F | BODY MASS INDEX: 20.44 KG/M2 | HEIGHT: 69 IN | OXYGEN SATURATION: 98 % | DIASTOLIC BLOOD PRESSURE: 70 MMHG | WEIGHT: 138 LBS | SYSTOLIC BLOOD PRESSURE: 108 MMHG | HEART RATE: 83 BPM

## 2020-01-10 DIAGNOSIS — R04.0 EPISTAXIS: ICD-10-CM

## 2020-01-10 DIAGNOSIS — J06.9 VIRAL URI WITH COUGH: ICD-10-CM

## 2020-01-10 PROCEDURE — 99214 OFFICE O/P EST MOD 30 MIN: CPT | Performed by: FAMILY MEDICINE

## 2020-01-10 RX ORDER — BENZONATATE 100 MG/1
100 CAPSULE ORAL 3 TIMES DAILY PRN
Qty: 30 CAP | Refills: 0 | Status: SHIPPED | OUTPATIENT
Start: 2020-01-10 | End: 2022-07-23

## 2020-01-10 ASSESSMENT — ENCOUNTER SYMPTOMS
COUGH: 1
DIZZINESS: 0
CHILLS: 0
NAUSEA: 0
SORE THROAT: 1
VOMITING: 0
SHORTNESS OF BREATH: 1
EYE PAIN: 0
FEVER: 0
MYALGIAS: 0

## 2020-01-10 NOTE — LETTER
January 10, 2020         Patient: Jamal Childress   YOB: 1995   Date of Visit: 1/10/2020           To Whom it May Concern:    Jamal Childress was seen in my clinic on 1/10/2020. He may return to work on 1/12/2020..    If you have any questions or concerns, please don't hesitate to call.        Sincerely,           Jay Garcia M.D.  Electronically Signed

## 2020-02-29 ENCOUNTER — HOSPITAL ENCOUNTER (OUTPATIENT)
Dept: RADIOLOGY | Facility: MEDICAL CENTER | Age: 25
End: 2020-02-29
Attending: PEDIATRICS
Payer: COMMERCIAL

## 2020-02-29 DIAGNOSIS — Q20.3 COMPLETE TRANSPOSITION OF GREAT VESSELS: ICD-10-CM

## 2020-02-29 PROCEDURE — C8911 MRA W/O FOL W/CONT, CHEST: HCPCS

## 2020-02-29 PROCEDURE — 700117 HCHG RX CONTRAST REV CODE 255: Performed by: PEDIATRICS

## 2020-02-29 PROCEDURE — A9576 INJ PROHANCE MULTIPACK: HCPCS | Performed by: PEDIATRICS

## 2020-02-29 RX ADMIN — GADOTERIDOL 14 ML: 279.3 INJECTION, SOLUTION INTRAVENOUS at 09:59

## 2021-08-02 ENCOUNTER — HOSPITAL ENCOUNTER (EMERGENCY)
Facility: MEDICAL CENTER | Age: 26
End: 2021-08-02
Attending: EMERGENCY MEDICINE | Admitting: EMERGENCY MEDICINE
Payer: COMMERCIAL

## 2021-08-02 ENCOUNTER — NON-PROVIDER VISIT (OUTPATIENT)
Dept: OCCUPATIONAL MEDICINE | Facility: CLINIC | Age: 26
End: 2021-08-02

## 2021-08-02 VITALS
DIASTOLIC BLOOD PRESSURE: 74 MMHG | BODY MASS INDEX: 20.9 KG/M2 | HEIGHT: 69 IN | TEMPERATURE: 97.4 F | SYSTOLIC BLOOD PRESSURE: 121 MMHG | OXYGEN SATURATION: 98 % | HEART RATE: 68 BPM | WEIGHT: 141.09 LBS | RESPIRATION RATE: 16 BRPM

## 2021-08-02 DIAGNOSIS — S39.012A STRAIN OF LUMBAR REGION, INITIAL ENCOUNTER: ICD-10-CM

## 2021-08-02 DIAGNOSIS — S09.90XA CLOSED HEAD INJURY, INITIAL ENCOUNTER: ICD-10-CM

## 2021-08-02 DIAGNOSIS — Z02.89 ENCOUNTER FOR OCCUPATIONAL HEALTH EXAMINATION: Primary | ICD-10-CM

## 2021-08-02 DIAGNOSIS — Z02.1 PRE-EMPLOYMENT DRUG SCREENING: ICD-10-CM

## 2021-08-02 DIAGNOSIS — T07.XXXA MULTIPLE CONTUSIONS: ICD-10-CM

## 2021-08-02 LAB
AMP AMPHETAMINE: NORMAL
BAR BARBITURATES: NORMAL
BREATH ALCOHOL COMMENT: NORMAL
BZO BENZODIAZEPINES: NORMAL
COC COCAINE: NORMAL
INT CON NEG: NORMAL
INT CON POS: NORMAL
MDMA ECSTASY: NORMAL
MET METHAMPHETAMINES: NORMAL
MTD METHADONE: NORMAL
OPI OPIATES: NORMAL
OXY OXYCODONE: NORMAL
PCP PHENCYCLIDINE: NORMAL
POC BREATHALIZER: 0 PERCENT (ref 0–0.01)
POC URINE DRUG SCREEN OCDRS: NEGATIVE
THC: NORMAL

## 2021-08-02 PROCEDURE — 80305 DRUG TEST PRSMV DIR OPT OBS: CPT | Performed by: NURSE PRACTITIONER

## 2021-08-02 PROCEDURE — 99282 EMERGENCY DEPT VISIT SF MDM: CPT | Mod: EDC

## 2021-08-02 PROCEDURE — 82075 ASSAY OF BREATH ETHANOL: CPT | Performed by: NURSE PRACTITIONER

## 2021-08-02 RX ORDER — METHOCARBAMOL 750 MG/1
750 TABLET, FILM COATED ORAL 4 TIMES DAILY
Qty: 20 TABLET | Refills: 0 | Status: SHIPPED | OUTPATIENT
Start: 2021-08-02 | End: 2022-07-23

## 2021-08-02 ASSESSMENT — LIFESTYLE VARIABLES
HAVE YOU EVER FELT YOU SHOULD CUT DOWN ON YOUR DRINKING: NO
AVERAGE NUMBER OF DAYS PER WEEK YOU HAVE A DRINK CONTAINING ALCOHOL: 0
TOTAL SCORE: 0
DO YOU DRINK ALCOHOL: NO
HOW MANY TIMES IN THE PAST YEAR HAVE YOU HAD 5 OR MORE DRINKS IN A DAY: 0
DOES PATIENT WANT TO STOP DRINKING: NO
TOTAL SCORE: 0
HAVE PEOPLE ANNOYED YOU BY CRITICIZING YOUR DRINKING: NO
ON A TYPICAL DAY WHEN YOU DRINK ALCOHOL HOW MANY DRINKS DO YOU HAVE: 0
EVER FELT BAD OR GUILTY ABOUT YOUR DRINKING: NO
EVER HAD A DRINK FIRST THING IN THE MORNING TO STEADY YOUR NERVES TO GET RID OF A HANGOVER: NO
TOTAL SCORE: 0
CONSUMPTION TOTAL: NEGATIVE

## 2021-08-02 ASSESSMENT — ENCOUNTER SYMPTOMS
HEADACHES: 1
DIZZINESS: 1
NAUSEA: 1
BACK PAIN: 1
LOSS OF CONSCIOUSNESS: 0
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
## 2021-08-03 NOTE — ED NOTES
"[facility].    Discharge instructions, which include signs and symptoms to monitor patient for, as well as detailed information regarding multiple contusions, closed head injury, and lumbar sprain provided.  All questions and concerns addressed at this time.    This RN also encouraged a follow- up appointment to be made with occupational health, office contact information with phone number and address provided.     Prescription for Robaxin provided to patient. Patient advised to not operate heavy machinery, drive, or drink alcohol while taking prescription.  Patient leaves ER in no apparent distress. This RN provided education regarding returning to the ER for any new concerns or changes in patient's condition.      /74   Pulse 68   Temp 36.3 °C (97.4 °F) (Temporal)   Resp 16   Ht 1.753 m (5' 9\")   Wt 64 kg (141 lb 1.5 oz)   SpO2 98%   BMI 20.84 kg/m²     "
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
## 2021-08-12 ENCOUNTER — OCCUPATIONAL MEDICINE (OUTPATIENT)
Dept: URGENT CARE | Facility: CLINIC | Age: 26
End: 2021-08-12
Payer: COMMERCIAL

## 2021-08-12 VITALS
OXYGEN SATURATION: 97 % | HEIGHT: 69 IN | WEIGHT: 142.6 LBS | HEART RATE: 91 BPM | SYSTOLIC BLOOD PRESSURE: 104 MMHG | BODY MASS INDEX: 21.12 KG/M2 | DIASTOLIC BLOOD PRESSURE: 70 MMHG | RESPIRATION RATE: 16 BRPM | TEMPERATURE: 97.8 F

## 2021-08-12 DIAGNOSIS — S39.012A STRAIN OF LUMBAR REGION, INITIAL ENCOUNTER: ICD-10-CM

## 2021-08-12 DIAGNOSIS — S00.93XA CONTUSION OF HEAD, UNSPECIFIED PART OF HEAD, INITIAL ENCOUNTER: ICD-10-CM

## 2021-08-12 PROCEDURE — 99213 OFFICE O/P EST LOW 20 MIN: CPT | Performed by: FAMILY MEDICINE

## 2021-08-12 ASSESSMENT — ENCOUNTER SYMPTOMS
ROS SKIN COMMENTS: NO ABRASION OR LACERATION
VOMITING: 0
MYALGIAS: 0
BLURRED VISION: 0
HEADACHES: 0
EYE REDNESS: 0
DOUBLE VISION: 0
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
## 2021-08-25 ENCOUNTER — OCCUPATIONAL MEDICINE (OUTPATIENT)
Dept: OCCUPATIONAL MEDICINE | Facility: CLINIC | Age: 26
End: 2021-08-25
Payer: COMMERCIAL

## 2021-08-25 VITALS
SYSTOLIC BLOOD PRESSURE: 132 MMHG | WEIGHT: 138 LBS | DIASTOLIC BLOOD PRESSURE: 70 MMHG | HEIGHT: 69 IN | TEMPERATURE: 97.6 F | HEART RATE: 57 BPM | BODY MASS INDEX: 20.44 KG/M2 | OXYGEN SATURATION: 97 %

## 2021-08-25 DIAGNOSIS — S00.83XD CONTUSION OF OTHER PART OF HEAD, SUBSEQUENT ENCOUNTER: ICD-10-CM

## 2021-08-25 DIAGNOSIS — S39.012D STRAIN OF LUMBAR REGION, SUBSEQUENT ENCOUNTER: ICD-10-CM

## 2021-08-25 PROCEDURE — 99202 OFFICE O/P NEW SF 15 MIN: CPT | Performed by: PREVENTIVE MEDICINE
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
## 2022-07-23 ENCOUNTER — OFFICE VISIT (OUTPATIENT)
Dept: URGENT CARE | Facility: CLINIC | Age: 27
End: 2022-07-23

## 2022-07-23 VITALS
HEART RATE: 107 BPM | TEMPERATURE: 100.1 F | RESPIRATION RATE: 18 BRPM | HEIGHT: 69 IN | OXYGEN SATURATION: 98 % | DIASTOLIC BLOOD PRESSURE: 84 MMHG | SYSTOLIC BLOOD PRESSURE: 122 MMHG | BODY MASS INDEX: 19.26 KG/M2 | WEIGHT: 130 LBS

## 2022-07-23 DIAGNOSIS — K08.89 PAIN, DENTAL: ICD-10-CM

## 2022-07-23 DIAGNOSIS — K04.7 DENTAL INFECTION: ICD-10-CM

## 2022-07-23 PROCEDURE — 96372 THER/PROPH/DIAG INJ SC/IM: CPT | Performed by: FAMILY MEDICINE

## 2022-07-23 PROCEDURE — 99213 OFFICE O/P EST LOW 20 MIN: CPT | Performed by: FAMILY MEDICINE

## 2022-07-23 RX ORDER — CLINDAMYCIN HYDROCHLORIDE 300 MG/1
300 CAPSULE ORAL 3 TIMES DAILY
Qty: 21 CAPSULE | Refills: 0 | Status: SHIPPED | OUTPATIENT
Start: 2022-07-23 | End: 2022-07-30

## 2022-07-23 RX ORDER — HYDROCODONE BITARTRATE AND ACETAMINOPHEN 5; 325 MG/1; MG/1
1 TABLET ORAL EVERY 8 HOURS PRN
Qty: 15 TABLET | Refills: 0 | Status: SHIPPED | OUTPATIENT
Start: 2022-07-23 | End: 2022-07-30

## 2022-07-23 RX ORDER — IBUPROFEN 600 MG/1
600 TABLET ORAL EVERY 8 HOURS PRN
Qty: 20 TABLET | Refills: 0 | Status: SHIPPED | OUTPATIENT
Start: 2022-07-23

## 2022-07-23 NOTE — PROGRESS NOTES
"Subjective     Jamal Childress is a 26 y.o. male who presents with Dental Pain (X2 days, gums pain, right side of cheek swollen ) and Chills      - This is a pleasant and nontoxic appearing 26 y.o. male who has come to the walk-in clinic today for:    #1) ~2 days ago developed Rt upper dental pain and swelling cheek. And felt a fever. No NV/cp/sob      ALLERGIES:  Patient has no known allergies.     PMH:  Past Medical History:   Diagnosis Date   • Transposition of great vessels         PSH:  Past Surgical History:   Procedure Laterality Date   • OTHER CARDIAC SURGERY      repair of great vessel transposition       MEDS:    Current Outpatient Medications:   •  clindamycin (CLEOCIN) 300 MG Cap, Take 1 Capsule by mouth 3 times a day for 7 days., Disp: 21 Capsule, Rfl: 0  •  HYDROcodone-acetaminophen (NORCO) 5-325 MG Tab per tablet, Take 1 Tablet by mouth every 8 hours as needed (pain) for up to 7 days., Disp: 15 Tablet, Rfl: 0  •  ibuprofen (MOTRIN) 600 MG Tab, Take 1 Tablet by mouth every 8 hours as needed (pain)., Disp: 20 Tablet, Rfl: 0  •  losartan (COZAAR) 50 MG TABS, Take 50 mg by mouth every day., Disp: , Rfl:     Current Facility-Administered Medications:   •  cefTRIAXone (ROCEPHIN) 500 mg, lidocaine (XYLOCAINE) 1 % 1.8 mL for IM use, 500 mg, Intramuscular, Once, Rikki Cook M.D.    ** I have documented what I find to be significant in regards to past medical, social, family and surgical history  in my HPI or under PMH/PSH/ review section, otherwise it is noncontributory **           HPI    Review of Systems   HENT:        Tooth pain/swelling   All other systems reviewed and are negative.             Objective     /84   Pulse (!) 107   Temp 37.8 °C (100.1 °F) (Temporal)   Resp 18   Ht 1.753 m (5' 9\")   Wt 59 kg (130 lb)   SpO2 98%   BMI 19.20 kg/m²      Physical Exam  Vitals and nursing note reviewed.   Constitutional:       General: He is not in acute distress.     Appearance: Normal " appearance. He is well-developed.   HENT:      Head: Normocephalic.        Mouth/Throat:      Mouth: Mucous membranes are moist.      Pharynx: Oropharynx is clear.        Comments: Poor dentition w/ multiple cavities. Some edema and ttp over Rt medial cheek   Cardiovascular:      Heart sounds: Normal heart sounds. No murmur heard.  Pulmonary:      Effort: Pulmonary effort is normal. No respiratory distress.   Neurological:      Mental Status: He is alert.      Motor: No abnormal muscle tone.   Psychiatric:         Mood and Affect: Mood normal.         Behavior: Behavior normal.                             Assessment & Plan       1. Pain, dental  HYDROcodone-acetaminophen (NORCO) 5-325 MG Tab per tablet    Referral to Dentistry   2. Dental infection  cefTRIAXone (ROCEPHIN) 500 mg, lidocaine (XYLOCAINE) 1 % 1.8 mL for IM use    clindamycin (CLEOCIN) 300 MG Cap    HYDROcodone-acetaminophen (NORCO) 5-325 MG Tab per tablet    ibuprofen (MOTRIN) 600 MG Tab    Referral to Dentistry       - Dx, plan & d/c instructions discussed   - warm salt water rinse/gragles 4-6x/day, warm compress over cheek 10min every 2-3hrs as needed  - E.R. precautions discussed     Asked to kindly follow up with Dentist ASAP, ER if not improving in 2-3 days or if feeling/getting worse.     Any realistic side effects of medications that may have been given today reviewed.     Patient left in stable condition      reviewed if narcotics given

## 2024-02-27 ENCOUNTER — HOSPITAL ENCOUNTER (OUTPATIENT)
Dept: RADIOLOGY | Facility: MEDICAL CENTER | Age: 29
End: 2024-02-27
Attending: PEDIATRICS
Payer: COMMERCIAL

## 2024-02-27 VITALS — DIASTOLIC BLOOD PRESSURE: 55 MMHG | SYSTOLIC BLOOD PRESSURE: 105 MMHG | HEART RATE: 70 BPM

## 2024-02-27 DIAGNOSIS — Q25.44 CONGENITAL DILATATION OF AORTA: ICD-10-CM

## 2024-02-27 DIAGNOSIS — Q20.3 COMPLETE TRANSPOSITION OF GREAT VESSELS: ICD-10-CM

## 2024-02-27 PROCEDURE — A9270 NON-COVERED ITEM OR SERVICE: HCPCS

## 2024-02-27 PROCEDURE — 700117 HCHG RX CONTRAST REV CODE 255: Performed by: PEDIATRICS

## 2024-02-27 PROCEDURE — 700102 HCHG RX REV CODE 250 W/ 637 OVERRIDE(OP)

## 2024-02-27 PROCEDURE — 75574 CT ANGIO HRT W/3D IMAGE: CPT

## 2024-02-27 RX ORDER — NITROGLYCERIN 0.4 MG/1
0.4 TABLET SUBLINGUAL
Status: DISCONTINUED | OUTPATIENT
Start: 2024-02-27 | End: 2024-02-28 | Stop reason: HOSPADM

## 2024-02-27 RX ORDER — NITROGLYCERIN 0.4 MG/1
TABLET SUBLINGUAL
Status: COMPLETED
Start: 2024-02-27 | End: 2024-02-27

## 2024-02-27 RX ADMIN — NITROGLYCERIN 0.4 MG: 0.4 TABLET SUBLINGUAL at 08:50

## 2024-02-27 RX ADMIN — IOHEXOL 60 ML: 350 INJECTION, SOLUTION INTRAVENOUS at 09:05

## 2024-02-27 RX ADMIN — NITROGLYCERIN 0.4 MG: 0.4 TABLET, ORALLY DISINTEGRATING SUBLINGUAL at 08:50

## 2024-02-27 NOTE — PROGRESS NOTES
Patient had CCTA.  Patient brought to preparation room.   Verbal consent given by patient for PIV and administration of Nitroglycerin by RN.  PIV initiated, 20 RAC    Review of medications, protocol, and NPO status completed.   Education provided to patient regarding examination.  Patient given baseline 3 lead EKG:   Vitals: 0750  BP: 133/73  HR: 75  RR: 16   100% RA     Patient ready for CT scan  Vitals: 0823  BP: 112/73  HR: 69  RR: 16  99% RA    Administration of Sublingual Nitroglycerin given per CCTA protocol at 0850--See MAR  Vitals: 0850  BP: 105/55  HR: 70  RR: 16  100% RA    Post Nitro Vitals: 0854  BP: 101/53  HR: 77  RR: 16  100% RA    END Vitals: 0900  BP: 124/66  HR: 72  RR: 16  99% RA    Patient returned to preparation area where post procedure education given. PIV removed, patient provided with water.     Vitals returned to baseline. Patient states they are ready to go home. Discharge education provided. Discharged per protocol.     Patient is ambulatory, escorted by RN to [facility].

## 2025-07-25 ENCOUNTER — APPOINTMENT (OUTPATIENT)
Dept: RADIOLOGY | Facility: MEDICAL CENTER | Age: 30
End: 2025-07-25
Attending: EMERGENCY MEDICINE
Payer: COMMERCIAL

## 2025-07-25 ENCOUNTER — HOSPITAL ENCOUNTER (EMERGENCY)
Facility: MEDICAL CENTER | Age: 30
End: 2025-07-25
Attending: EMERGENCY MEDICINE
Payer: COMMERCIAL

## 2025-07-25 VITALS
HEART RATE: 78 BPM | RESPIRATION RATE: 16 BRPM | BODY MASS INDEX: 18.61 KG/M2 | DIASTOLIC BLOOD PRESSURE: 89 MMHG | SYSTOLIC BLOOD PRESSURE: 129 MMHG | WEIGHT: 130 LBS | TEMPERATURE: 97.5 F | HEIGHT: 70 IN | OXYGEN SATURATION: 96 %
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
## 2025-07-25 DIAGNOSIS — R07.89 CHEST WALL PAIN: ICD-10-CM

## 2025-07-25 DIAGNOSIS — S09.90XA CLOSED HEAD INJURY, INITIAL ENCOUNTER: ICD-10-CM

## 2025-07-25 PROCEDURE — 71045 X-RAY EXAM CHEST 1 VIEW: CPT

## 2025-07-25 PROCEDURE — 99284 EMERGENCY DEPT VISIT MOD MDM: CPT

## 2025-07-25 PROCEDURE — 307740 HCHG GREEN TRAUMA TEAM SERVICES

## 2025-07-25 PROCEDURE — 70450 CT HEAD/BRAIN W/O DYE: CPT
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
## 2025-07-25 NOTE — ED NOTES
(Assist RN) Patient discharged home per ERP.  Discharge teaching and education discussed with patient. POC discussed.   Patient verbalized understanding of discharge teaching and education. No other questions at this time.     Work note provided.    VSS. Patient alert and oriented. Patient arranged ride for self. Able to ambulate off unit safely with steady gait.
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
## 2025-07-25 NOTE — DISCHARGE INSTRUCTIONS
See a doctor for recheck if not better in 1 week, return to the emergency department for worsening symptoms or any concerns.

## 2025-07-25 NOTE — Clinical Note
Jamal Childress was seen and treated in our emergency department on 7/25/2025.  He may return to work on 07/28/2025.       If you have any questions or concerns, please don't hesitate to call.      Sunday Acosta M.D.